# Patient Record
Sex: FEMALE | Race: BLACK OR AFRICAN AMERICAN | NOT HISPANIC OR LATINO | Employment: UNEMPLOYED | URBAN - METROPOLITAN AREA
[De-identification: names, ages, dates, MRNs, and addresses within clinical notes are randomized per-mention and may not be internally consistent; named-entity substitution may affect disease eponyms.]

---

## 2017-10-09 ENCOUNTER — TELEPHONE (OUTPATIENT)
Dept: ADMINISTRATIVE | Facility: HOSPITAL | Age: 62
End: 2017-10-09

## 2017-10-11 PROBLEM — I10 HYPERTENSION: Status: ACTIVE | Noted: 2017-10-11

## 2017-10-11 PROBLEM — E11.9 DIABETES MELLITUS, TYPE 2: Status: ACTIVE | Noted: 2017-10-11

## 2017-10-11 PROBLEM — E78.5 HYPERLIPIDEMIA: Status: ACTIVE | Noted: 2017-10-11

## 2018-01-26 ENCOUNTER — TELEPHONE (OUTPATIENT)
Dept: ADMINISTRATIVE | Facility: HOSPITAL | Age: 63
End: 2018-01-26

## 2018-08-16 ENCOUNTER — TELEPHONE (OUTPATIENT)
Dept: ADMINISTRATIVE | Facility: HOSPITAL | Age: 63
End: 2018-08-16

## 2018-12-18 ENCOUNTER — TELEPHONE (OUTPATIENT)
Dept: ADMINISTRATIVE | Facility: HOSPITAL | Age: 63
End: 2018-12-18

## 2019-03-03 ENCOUNTER — HOSPITAL ENCOUNTER (EMERGENCY)
Facility: HOSPITAL | Age: 64
Discharge: HOME | End: 2019-03-03
Attending: EMERGENCY MEDICINE
Payer: MEDICAID

## 2019-03-03 VITALS
DIASTOLIC BLOOD PRESSURE: 86 MMHG | HEART RATE: 71 BPM | RESPIRATION RATE: 20 BRPM | OXYGEN SATURATION: 98 % | TEMPERATURE: 97 F | SYSTOLIC BLOOD PRESSURE: 180 MMHG

## 2019-03-03 DIAGNOSIS — I10 HYPERTENSION, UNSPECIFIED TYPE: ICD-10-CM

## 2019-03-03 DIAGNOSIS — S39.012A STRAIN OF LUMBAR REGION, INITIAL ENCOUNTER: Primary | ICD-10-CM

## 2019-03-03 PROCEDURE — 25000003 PHARM REV CODE 250: Performed by: NURSE PRACTITIONER

## 2019-03-03 PROCEDURE — 99284 EMERGENCY DEPT VISIT MOD MDM: CPT

## 2019-03-03 RX ORDER — NAPROXEN 250 MG/1
500 TABLET ORAL
Status: COMPLETED | OUTPATIENT
Start: 2019-03-03 | End: 2019-03-03

## 2019-03-03 RX ORDER — NAPROXEN 500 MG/1
500 TABLET ORAL 2 TIMES DAILY PRN
Qty: 30 TABLET | Refills: 0 | Status: SHIPPED | OUTPATIENT
Start: 2019-03-03 | End: 2019-11-11

## 2019-03-03 RX ORDER — CLONIDINE HYDROCHLORIDE 0.1 MG/1
0.1 TABLET ORAL
Status: COMPLETED | OUTPATIENT
Start: 2019-03-03 | End: 2019-03-03

## 2019-03-03 RX ORDER — CYCLOBENZAPRINE HCL 10 MG
5 TABLET ORAL 3 TIMES DAILY PRN
Qty: 9 TABLET | Refills: 0 | Status: SHIPPED | OUTPATIENT
Start: 2019-03-03 | End: 2019-03-08

## 2019-03-03 RX ORDER — ATENOLOL 100 MG/1
100 TABLET ORAL DAILY
Qty: 10 TABLET | Refills: 0 | Status: SHIPPED | OUTPATIENT
Start: 2019-03-03 | End: 2019-03-14 | Stop reason: SDUPTHER

## 2019-03-03 RX ADMIN — CLONIDINE HYDROCHLORIDE 0.1 MG: 0.1 TABLET ORAL at 01:03

## 2019-03-03 RX ADMIN — NAPROXEN 500 MG: 250 TABLET ORAL at 01:03

## 2019-03-03 NOTE — DISCHARGE INSTRUCTIONS
Back Sprain or Strain    Injury to the muscles (strain) or ligaments (sprain) around the spine can be troubling. Injury may occur after a sudden forceful twisting or bending force such as in a car accident, after a simple awkward movement, or after lifting something heavy with poor body positioning. In any case, muscle spasm is often present and adds to the pain.  Thankfully, most people feel better in 1 to 2 weeks, and most of the rest in 1 to 2 months. Most people can remain active. Unless you had a forceful or traumatic physical injury such as a car accident or fall, X-rays may not be ordered for the first evaluation of a back sprain or strain. If pain continues and does not respond to medical treatment, your healthcare provider may then order X-rays and other tests.  Home care  The following guidelines will help you care for your injury at home:  When in bed, try to find a comfortable position. A firm mattress is best. Try lying flat on your back with pillows under your knees. You can also try lying on your side with your knees bent up toward your chest and a pillow between your knees.  Don't sit for long periods. Try not to take long car rides or take other trips that have you sitting for a long time. This puts more stress on the lower back than standing or walking.  During the first 24 to 72 hours after an injury or flare-up, apply an ice pack to the painful area for 20 minutes. Then remove it for 20 minutes. Do this for 60 to 90 minutes, or several times a day. This will reduce swelling and pain. Be sure to wrap the ice pack in a thin towel or plastic to protect your skin.  You can start with ice, then switch to heat. Heat from a hot shower, hot bath, or heating pad reduces pain and works well for muscle spasms. Put heat on the painful area for 20 minutes, then remove for 20 minutes. Do this for 60 to 90 minutes, or several times a day. Do not use a heating pad while sleeping. It can burn the skin.  You can  alternate the ice and heat. Talk with your healthcare provider to find out the best treatment or therapy for your back pain.  Therapeutic massage will help relax the back muscles without stretching them.  Be aware of safe lifting methods. Do not lift anything over 15 pounds until all of the pain is gone.  Medicines  Talk to your healthcare provider before using medicines, especially if you have other health problems or are taking other medicines.  You may use acetaminophen or ibuprofen to control pain, unless another pain medicine was prescribed. If you have chronic conditions like diabetes, liver or kidney disease, stomach ulcers, or gastrointestinal bleeding, or are taking blood-thinner medicines, talk with your doctor before taking any medicines.  Be careful if you are given prescription medicines, narcotics, or medicine for muscle spasm. They can cause drowsiness, and affect your coordination, reflexes, and judgment. Do not drive or operate heavy machinery when taking these types of medicines. Only take pain medicine as prescribed by your healthcare provider.  Follow-up care  Follow up with your healthcare provider, or as advised. You may need physical therapy or more tests if your symptoms get worse.  If you had X-rays your healthcare provider may be checking for any broken bones, breaks, or fractures. Bruises and sprains can sometimes hurt as much as a fracture. These injuries can take time to heal completely. If your symptoms don?t improve or they get worse, talk with your healthcare provider. You may need a repeat X-ray or other tests.  Call 911  Call for emergency care if any of the following occur:  Trouble breathing  Confused  Very drowsy or trouble awakening  Fainting or loss of consciousness  Rapid or very slow heart rate  Loss of bowel or bladder control  When to seek medical advice  Call your healthcare provider right away if any of the following occur:  Pain gets worse or spreads to your arms or  legs  Weakness or numbness in one or both arms or legs  Numbness in the groin or genital area  Date Last Reviewed: 6/1/2016  © 9794-8533 [employer]. [address] [place] [address], [place] [address]. All rights reserved. This information is not intended as a substitute for professional medical care. Always follow your healthcare professional's instructions.

## 2019-03-03 NOTE — ED PROVIDER NOTES
Encounter Date: 3/3/2019       History     Chief Complaint   Patient presents with    Back Pain     Cindy Hernandez is a 63 y.o. Female who presents to the ED with daughter with reports lower back pain x1 week.  Patient reports aching pain to bilateral lower back rated 5/10 on pain scale.  Patient reports this pain increases with movement.  Denies numbness or tingling to lower extremities, denies radiation.  Denies bowel or bladder dysfunction.  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  Reports this may be the culprit of her problem.  Patient reports has been taking ibuprofen at home with moderate relief of pain. Denies dysuria, urgency or frequency.  Denies fever.  Patient also reports has been on atenolol at home.  Reports doctor has been out of town and called for refills, however has been out for a week.  Denies dizziness, lightheadedness, or blurry vision.  Denies chest pain or shortness of breath.        The history is provided by the patient.     Review of patient's allergies indicates:   Allergen Reactions    Sulfa (sulfonamide antibiotics) Hives     Past Medical History:   Diagnosis Date    Arthritis     Diabetes mellitus, type 2     GERD (gastroesophageal reflux disease)     Hyperlipidemia     Hypertension      Past Surgical History:   Procedure Laterality Date     SECTION       No family history on file.  Social History     Tobacco Use    Smoking status: Current Some Day Smoker     Packs/day: 0.50     Years: 25.00     Pack years: 12.50     Types: Cigarettes    Smokeless tobacco: Current User   Substance Use Topics    Alcohol use: No     Alcohol/week: 0.0 oz    Drug use: No     Review of Systems   Constitutional: Negative for activity change, chills and fever.   HENT: Negative.  Negative for congestion, ear discharge, ear pain, postnasal drip, sinus pressure, sinus pain and sore throat.    Eyes: Negative.    Respiratory: Negative.  Negative for  cough, chest tightness and shortness of breath.    Cardiovascular: Negative.  Negative for chest pain.   Gastrointestinal: Negative.  Negative for abdominal distention, abdominal pain and nausea.   Endocrine: Negative.    Genitourinary: Negative.  Negative for dysuria, frequency and urgency.   Musculoskeletal: Positive for arthralgias and back pain.        Bilateral lower back pain. Denies numbness or tingling to bilateral lower extremities.  Denies bowel or bladder dysfunction.   Skin: Negative.  Negative for rash.   Allergic/Immunologic: Negative.    Neurological: Negative.  Negative for dizziness, weakness, light-headedness and numbness.   Hematological: Negative.  Does not bruise/bleed easily.   Psychiatric/Behavioral: Negative.        Physical Exam     Initial Vitals [03/03/19 1248]   BP Pulse Resp Temp SpO2   (!) 213/101 77 20 98.5 °F (36.9 °C) --      MAP       --         Physical Exam    Nursing note and vitals reviewed.  Constitutional: She appears well-developed and well-nourished.   HENT:   Head: Normocephalic and atraumatic.   Right Ear: External ear normal.   Left Ear: External ear normal.   Nose: Nose normal.   Mouth/Throat: Oropharynx is clear and moist.   Eyes: Conjunctivae and EOM are normal. Pupils are equal, round, and reactive to light.   Neck: Normal range of motion. Neck supple.   Cardiovascular: Normal rate, regular rhythm, normal heart sounds and intact distal pulses.   Pulmonary/Chest: Effort normal and breath sounds normal. She has no decreased breath sounds. She has no wheezes. She has no rhonchi. She has no rales.   Abdominal: Soft. Bowel sounds are normal. There is no tenderness.   Musculoskeletal: Normal range of motion. She exhibits tenderness.        Right shoulder: She exhibits tenderness.   Tender to palpate mid l/s spine.  Negative straight leg raise.  No sensory or motor deficits noted.   Neurological: She is alert and oriented to person, place, and time. She has normal strength.  She displays normal reflexes. No cranial nerve deficit or sensory deficit.   Skin: Skin is warm and dry. Capillary refill takes less than 2 seconds. No rash noted.   Psychiatric: She has a normal mood and affect. Her behavior is normal. Judgment and thought content normal.         ED Course   Procedures  Labs Reviewed - No data to display       Imaging Results          X-Ray Lumbar Spine Ap And Lateral (Final result)  Result time 03/03/19 13:16:41    Final result by Jefferson Brewster MD (03/03/19 13:16:41)                 Impression:      Degenerative changes, as above.  No fracture identified      Electronically signed by: Jefferson Brewster MD  Date:    03/03/2019  Time:    13:16             Narrative:    EXAMINATION:  XR LUMBAR SPINE AP AND LATERAL    CLINICAL HISTORY:  Low back pain, <6wks, no red flags, no prior management;Lumbar radiculopathy, <6wks, no red flags, no prior management;Low back pain, minor trauma;    TECHNIQUE:  AP, lateral and spot images were performed of the lumbar spine.    COMPARISON:  None    FINDINGS:  Lumbar spine x-rays demonstrates straightening of the lumbar lordosis.  There is slight narrowing of the L4-5 disc space.  No spondylolisthesis or compression fracture is seen.  Minimal curvature of the lumbar spine is noted to the right.  Lower lumbar facet arthropathy is present with some hypertrophic changes particularly at L4-5.                                  Medications   cloNIDine tablet 0.1 mg (0.1 mg Oral Given 3/3/19 1303)   naproxen tablet 500 mg (500 mg Oral Given 3/3/19 1304)                             Clinical Impression:       ICD-10-CM ICD-9-CM   1. Strain of lumbar region, initial encounter S39.012A 847.2   2. Hypertension, unspecified type I10 401.9         Disposition:   Disposition: Discharged  Condition: Stable       Discharged home with prescription for Flexeril and naproxen.  Refill x's 10 days for atenolol, reinforcement on following up with PCP completed.The patient  acknowledges that close follow up with medical provider is required. Instructed to follow up with PCP within 2 days. Patient was given specific return precautions. The patient agrees to comply with all instruction and directions given in the ER.                  Anel Batista NP  03/03/19 9501

## 2019-03-03 NOTE — ED NOTES
Care assumed of pt, agree with assessment of previous nurse. Continuous visual contact continues as before. Pt remains calm and cooperative. Instructed to call for needs and voiced understanding. Denies needs at  this time.

## 2019-03-03 NOTE — ED NOTES
The patient was seen, evaluated and discharged. All questions were asked and/or answered and the pt was discharged with written and verbal instructions.  Discharged to home/self care.    - Condition at discharge: Good  - Mode of Discharge: Ambulatory  - The patient left the ED accompanied by a family member.  - The discharge instructions were discussed with the patient.  - Patient state an understanding of the discharge instructions.

## 2019-04-11 LAB
LEFT EYE DM RETINOPATHY: NEGATIVE
RIGHT EYE DM RETINOPATHY: NEGATIVE

## 2019-05-01 PROBLEM — I63.81 LACUNAR STROKE: Status: ACTIVE | Noted: 2019-05-01

## 2019-05-01 PROBLEM — I63.9 CVA (CEREBRAL VASCULAR ACCIDENT): Status: ACTIVE | Noted: 2019-05-01

## 2019-05-02 PROBLEM — I63.9 STROKE: Status: ACTIVE | Noted: 2019-05-02

## 2019-05-03 PROBLEM — I63.9 STROKE: Status: RESOLVED | Noted: 2019-05-02 | Resolved: 2019-05-03

## 2019-05-06 ENCOUNTER — PATIENT OUTREACH (OUTPATIENT)
Dept: ADMINISTRATIVE | Facility: HOSPITAL | Age: 64
End: 2019-05-06

## 2019-05-17 ENCOUNTER — NURSE TRIAGE (OUTPATIENT)
Dept: ADMINISTRATIVE | Facility: CLINIC | Age: 64
End: 2019-05-17

## 2019-05-17 NOTE — TELEPHONE ENCOUNTER
Reason for Disposition   Pharmacy calling with prescription question and triager answers question    Protocols used: MEDICATION QUESTION CALL-A-    Caller stating they were trying to get patient's rx for Lipitor transferred from [facility] in [place] to [facility] in [place], [place] but the pharmacy in [place] never received the rx. Patient states she will only be in [place], [place] until the end of this month. 1 rx for Lipitor with no refills called in to [facility] in [place]. No other needs voiced at this time. Please contact caller directly to discuss any further care advice.

## 2019-07-08 PROBLEM — Z79.4 CONTROLLED TYPE 2 DIABETES MELLITUS WITHOUT COMPLICATION, WITH LONG-TERM CURRENT USE OF INSULIN: Status: ACTIVE | Noted: 2017-10-11

## 2019-08-27 PROBLEM — I63.411 CEREBRAL INFARCTION DUE TO EMBOLISM OF RIGHT MIDDLE CEREBRAL ARTERY: Status: ACTIVE | Noted: 2019-08-27

## 2019-08-28 ENCOUNTER — HOSPITAL ENCOUNTER (OUTPATIENT)
Facility: HOSPITAL | Age: 64
LOS: 1 days | Discharge: HOME | End: 2019-08-28
Attending: INTERNAL MEDICINE | Admitting: INTERNAL MEDICINE
Payer: MEDICARE

## 2019-08-28 VITALS
HEART RATE: 84 BPM | RESPIRATION RATE: 18 BRPM | DIASTOLIC BLOOD PRESSURE: 71 MMHG | WEIGHT: 130.31 LBS | OXYGEN SATURATION: 96 % | HEIGHT: 59 IN | BODY MASS INDEX: 26.27 KG/M2 | SYSTOLIC BLOOD PRESSURE: 122 MMHG | TEMPERATURE: 98 F

## 2019-08-28 DIAGNOSIS — I63.9 ACUTE CVA (CEREBROVASCULAR ACCIDENT): ICD-10-CM

## 2019-08-28 DIAGNOSIS — I63.9 STROKE: ICD-10-CM

## 2019-08-28 PROBLEM — E78.5 HYPERLIPIDEMIA: Chronic | Status: ACTIVE | Noted: 2017-10-11

## 2019-08-28 PROBLEM — I10 ESSENTIAL HYPERTENSION: Chronic | Status: ACTIVE | Noted: 2017-10-11

## 2019-08-28 PROBLEM — E11.9 CONTROLLED TYPE 2 DIABETES MELLITUS WITHOUT COMPLICATION, WITH LONG-TERM CURRENT USE OF INSULIN: Chronic | Status: ACTIVE | Noted: 2017-10-11

## 2019-08-28 PROBLEM — F32.A DEPRESSION: Chronic | Status: ACTIVE | Noted: 2019-08-28

## 2019-08-28 PROBLEM — K21.9 GERD (GASTROESOPHAGEAL REFLUX DISEASE): Chronic | Status: ACTIVE | Noted: 2019-08-28

## 2019-08-28 PROBLEM — Z86.73 HISTORY OF CVA (CEREBROVASCULAR ACCIDENT): Status: ACTIVE | Noted: 2019-08-28

## 2019-08-28 PROBLEM — Z79.4 CONTROLLED TYPE 2 DIABETES MELLITUS WITHOUT COMPLICATION, WITH LONG-TERM CURRENT USE OF INSULIN: Chronic | Status: ACTIVE | Noted: 2017-10-11

## 2019-08-28 PROBLEM — Z86.73 HISTORY OF CVA (CEREBROVASCULAR ACCIDENT): Chronic | Status: ACTIVE | Noted: 2019-08-28

## 2019-08-28 LAB
AORTIC ROOT ANNULUS: 2.98 CM
AORTIC VALVE CUSP SEPERATION: 2.1 CM
ASCENDING AORTA: 2.84 CM
AV INDEX (PROSTH): 0.74
AV MEAN GRADIENT: 4 MMHG
AV PEAK GRADIENT: 8 MMHG
AV VALVE AREA: 2.62 CM2
AV VELOCITY RATIO: 0.75
BSA FOR ECHO PROCEDURE: 1.57 M2
CV ECHO LV RWT: 0.7 CM
DOP CALC AO PEAK VEL: 1.37 M/S
DOP CALC AO VTI: 25.13 CM
DOP CALC LVOT AREA: 3.5 CM2
DOP CALC LVOT DIAMETER: 2.12 CM
DOP CALC LVOT PEAK VEL: 1.03 M/S
DOP CALC LVOT STROKE VOLUME: 65.83 CM3
DOP CALCLVOT PEAK VEL VTI: 18.66 CM
E WAVE DECELERATION TIME: 237.59 MSEC
E/A RATIO: 0.56
E/E' RATIO: 7.88 M/S
ECHO LV POSTERIOR WALL: 1.23 CM (ref 0.6–1.1)
ESTIMATED AVG GLUCOSE: 189 MG/DL (ref 68–131)
FRACTIONAL SHORTENING: 40 % (ref 28–44)
HBA1C MFR BLD HPLC: 8.2 % (ref 4–5.6)
INTERVENTRICULAR SEPTUM: 1.26 CM (ref 0.6–1.1)
IVRT: 0.12 MSEC
LA MAJOR: 4.79 CM
LA MINOR: 5.14 CM
LA WIDTH: 3.38 CM
LEFT ATRIUM SIZE: 3.19 CM
LEFT ATRIUM VOLUME INDEX: 29.6 ML/M2
LEFT ATRIUM VOLUME: 45.45 CM3
LEFT INTERNAL DIMENSION IN SYSTOLE: 2.12 CM (ref 2.1–4)
LEFT VENTRICLE DIASTOLIC VOLUME INDEX: 33.55 ML/M2
LEFT VENTRICLE DIASTOLIC VOLUME: 51.56 ML
LEFT VENTRICLE MASS INDEX: 94 G/M2
LEFT VENTRICLE SYSTOLIC VOLUME INDEX: 9.6 ML/M2
LEFT VENTRICLE SYSTOLIC VOLUME: 14.83 ML
LEFT VENTRICULAR INTERNAL DIMENSION IN DIASTOLE: 3.52 CM (ref 3.5–6)
LEFT VENTRICULAR MASS: 144.93 G
LV LATERAL E/E' RATIO: 6.3 M/S
LV SEPTAL E/E' RATIO: 10.5 M/S
MV PEAK A VEL: 1.12 M/S
MV PEAK E VEL: 0.63 M/S
PISA TR MAX VEL: 2.34 M/S
POCT GLUCOSE: 171 MG/DL (ref 70–110)
POCT GLUCOSE: 188 MG/DL (ref 70–110)
PULM VEIN S/D RATIO: 2.16
PV PEAK D VEL: 0.38 M/S
PV PEAK S VEL: 0.82 M/S
PV PEAK VELOCITY: 1.06 CM/S
RA MAJOR: 4.07 CM
RA PRESSURE: 3 MMHG
RA WIDTH: 3.54 CM
RIGHT VENTRICULAR END-DIASTOLIC DIMENSION: 3.55 CM
RV TISSUE DOPPLER FREE WALL SYSTOLIC VELOCITY 1 (APICAL 4 CHAMBER VIEW): 13.47 CM/S
SINUS: 2.82 CM
STJ: 2.33 CM
TDI LATERAL: 0.1 M/S
TDI SEPTAL: 0.06 M/S
TDI: 0.08 M/S
TR MAX PG: 22 MMHG
TRICUSPID ANNULAR PLANE SYSTOLIC EXCURSION: 1.4 CM
TV REST PULMONARY ARTERY PRESSURE: 25 MMHG

## 2019-08-28 PROCEDURE — 99222 PR INITIAL HOSPITAL CARE,LEVL II: ICD-10-PCS | Mod: ,,, | Performed by: PSYCHIATRY & NEUROLOGY

## 2019-08-28 PROCEDURE — 97165 OT EVAL LOW COMPLEX 30 MIN: CPT

## 2019-08-28 PROCEDURE — 25000003 PHARM REV CODE 250: Performed by: INTERNAL MEDICINE

## 2019-08-28 PROCEDURE — 36415 COLL VENOUS BLD VENIPUNCTURE: CPT

## 2019-08-28 PROCEDURE — G8996 SWALLOW CURRENT STATUS: HCPCS | Mod: CH

## 2019-08-28 PROCEDURE — A9585 GADOBUTROL INJECTION: HCPCS | Performed by: INTERNAL MEDICINE

## 2019-08-28 PROCEDURE — 97161 PT EVAL LOW COMPLEX 20 MIN: CPT

## 2019-08-28 PROCEDURE — G8998 SWALLOW D/C STATUS: HCPCS | Mod: CH

## 2019-08-28 PROCEDURE — G0378 HOSPITAL OBSERVATION PER HR: HCPCS

## 2019-08-28 PROCEDURE — 25500020 PHARM REV CODE 255: Performed by: INTERNAL MEDICINE

## 2019-08-28 PROCEDURE — 83036 HEMOGLOBIN GLYCOSYLATED A1C: CPT

## 2019-08-28 PROCEDURE — 92610 EVALUATE SWALLOWING FUNCTION: CPT

## 2019-08-28 PROCEDURE — 99222 1ST HOSP IP/OBS MODERATE 55: CPT | Mod: ,,, | Performed by: PSYCHIATRY & NEUROLOGY

## 2019-08-28 RX ORDER — PANTOPRAZOLE SODIUM 40 MG/1
40 TABLET, DELAYED RELEASE ORAL DAILY
Status: DISCONTINUED | OUTPATIENT
Start: 2019-08-28 | End: 2019-08-28 | Stop reason: HOSPADM

## 2019-08-28 RX ORDER — ASPIRIN 81 MG/1
81 TABLET ORAL DAILY
Status: DISCONTINUED | OUTPATIENT
Start: 2019-08-28 | End: 2019-08-28 | Stop reason: HOSPADM

## 2019-08-28 RX ORDER — GLUCAGON 1 MG
1 KIT INJECTION
Status: DISCONTINUED | OUTPATIENT
Start: 2019-08-28 | End: 2019-08-28 | Stop reason: HOSPADM

## 2019-08-28 RX ORDER — IBUPROFEN 200 MG
16 TABLET ORAL
Status: DISCONTINUED | OUTPATIENT
Start: 2019-08-28 | End: 2019-08-28 | Stop reason: HOSPADM

## 2019-08-28 RX ORDER — CLONIDINE HYDROCHLORIDE 0.1 MG/1
0.1 TABLET ORAL 3 TIMES DAILY PRN
Status: DISCONTINUED | OUTPATIENT
Start: 2019-08-28 | End: 2019-08-28 | Stop reason: HOSPADM

## 2019-08-28 RX ORDER — IBUPROFEN 200 MG
24 TABLET ORAL
Status: DISCONTINUED | OUTPATIENT
Start: 2019-08-28 | End: 2019-08-28 | Stop reason: HOSPADM

## 2019-08-28 RX ORDER — GADOBUTROL 604.72 MG/ML
6 INJECTION INTRAVENOUS
Status: COMPLETED | OUTPATIENT
Start: 2019-08-28 | End: 2019-08-28

## 2019-08-28 RX ORDER — INSULIN ASPART 100 [IU]/ML
6 INJECTION, SOLUTION INTRAVENOUS; SUBCUTANEOUS
Status: DISCONTINUED | OUTPATIENT
Start: 2019-08-28 | End: 2019-08-28 | Stop reason: HOSPADM

## 2019-08-28 RX ORDER — POTASSIUM CHLORIDE 20 MEQ/1
60 TABLET, EXTENDED RELEASE ORAL ONCE
Status: COMPLETED | OUTPATIENT
Start: 2019-08-28 | End: 2019-08-28

## 2019-08-28 RX ORDER — AMOXICILLIN 250 MG
1 CAPSULE ORAL DAILY PRN
Status: DISCONTINUED | OUTPATIENT
Start: 2019-08-28 | End: 2019-08-28 | Stop reason: HOSPADM

## 2019-08-28 RX ORDER — INSULIN ASPART 100 [IU]/ML
0-5 INJECTION, SOLUTION INTRAVENOUS; SUBCUTANEOUS
Status: DISCONTINUED | OUTPATIENT
Start: 2019-08-28 | End: 2019-08-28 | Stop reason: HOSPADM

## 2019-08-28 RX ORDER — ONDANSETRON 2 MG/ML
8 INJECTION INTRAMUSCULAR; INTRAVENOUS EVERY 8 HOURS PRN
Status: DISCONTINUED | OUTPATIENT
Start: 2019-08-28 | End: 2019-08-28 | Stop reason: HOSPADM

## 2019-08-28 RX ORDER — ATORVASTATIN CALCIUM 40 MG/1
80 TABLET, FILM COATED ORAL NIGHTLY
Status: DISCONTINUED | OUTPATIENT
Start: 2019-08-28 | End: 2019-08-28 | Stop reason: HOSPADM

## 2019-08-28 RX ORDER — CLOPIDOGREL BISULFATE 75 MG/1
75 TABLET ORAL DAILY
Status: DISCONTINUED | OUTPATIENT
Start: 2019-08-28 | End: 2019-08-28 | Stop reason: HOSPADM

## 2019-08-28 RX ORDER — FLUOXETINE HYDROCHLORIDE 20 MG/1
20 CAPSULE ORAL DAILY
Status: DISCONTINUED | OUTPATIENT
Start: 2019-08-28 | End: 2019-08-28 | Stop reason: HOSPADM

## 2019-08-28 RX ADMIN — FLUOXETINE 20 MG: 20 CAPSULE ORAL at 04:08

## 2019-08-28 RX ADMIN — GADOBUTROL 6 ML: 604.72 INJECTION INTRAVENOUS at 12:08

## 2019-08-28 RX ADMIN — POTASSIUM CHLORIDE 60 MEQ: 1500 TABLET, EXTENDED RELEASE ORAL at 04:08

## 2019-08-28 RX ADMIN — ASPIRIN 81 MG: 81 TABLET, COATED ORAL at 04:08

## 2019-08-28 NOTE — NURSING
Contacted Dr. Coughlin via chat for telemetry monitor to be off when going for test. See new order. Patient remains NPO.

## 2019-08-28 NOTE — PLAN OF CARE
08/28/19 1633   Final Note   Assessment Type Final Discharge Note   Anticipated Discharge Disposition Home   What phone number can be called within the next 1-3 days to see how you are doing after discharge?   (Listed in chart)   Hospital Follow Up  Appt(s) scheduled? No  (Per clinic nurse will contact pt for both appts, PCP and neurology)   Discharge plans and expectations educations in teach back method with documentation complete? Yes  (Per floor nurse, Connie)   Right Care Referral Info   Post Acute Recommendation No Care     TN informed floor nurse, Connie, care management is complete and can proceed with discharge teaching.

## 2019-08-28 NOTE — PLAN OF CARE
Problem: Occupational Therapy Goal  Goal: Occupational Therapy Goal  Outcome: Outcome(s) achieved Date Met: 08/28/19  OT initial eval complete. Pt completes ADLs and all aspects of functional mobility with supervision. Pt reports no need for OT and compliant with home UE theraband exercises- best friend/neighbor present and agreed/good support system. Pt encouraged to be up in the chair throughout the day, complete ankle pumps, and complete ADL routine with staff SUP. Pt has no DME/OT needs at d/c. OT to sign off.

## 2019-08-28 NOTE — PT/OT/SLP PROGRESS
Speech Language Pathology      Cindy Hernandez  MRN: [id number]    Patient NPO for MRI. ST will attempt swallow eval at bedside following pt testing.    Jaki Sykes, CCC-SLP

## 2019-08-28 NOTE — PT/OT/SLP EVAL
Occupational Therapy   Evaluation and Discharge     Name: Cindy Hernandez  MRN: [id number]  Admitting Diagnosis:  Acute CVA (cerebrovascular accident)      Recommendations:     Discharge Recommendations: home  Discharge Equipment Recommendations:  none  Barriers to discharge:  None    Assessment:     Cindy Hernandez is a 63 y.o. female with a medical diagnosis of Acute CVA (cerebrovascular accident). Pt completes ADLs and all aspects of functional mobility with supervision. Pt reports no need for OT and compliant with UE theraband exercises- best friend/neighbor present and agreed/good support system. Pt encouraged to be up in the chair throughout the day, complete ankle pumps daily, and complete ADL routine with staff SUP. Pt has no DME/OT needs at d/c. OT to sign off.     Rehab Prognosis: Good; patient would benefit from acute skilled OT services to address these deficits and reach maximum level of function.       Plan:     During this hospitalization, patient does not require further acute OT services.  Please re-consult if situation changes.   · Plan of Care Expires:  08/28/19  · Plan of Care Reviewed with: patient, friend    Subjective     Chief Complaint: tired from restless night   Patient/Family Comments/goals: go home     Occupational Profile:  Living Environment: Pt lives in an apartment with [address] by herself. Pt best friend is her neighbor and supportive. Sister lives nearby.   Previous level of function: Independent with ADLs and all aspects of functional mobility. No DME.   Roles and Routines: occasionally drives; works as  in a hotel   Equipment Used at Home:  none  Assistance upon Discharge: sister & friend     Pain/Comfort:  · Pain Rating 1: 0/10    Patients cultural, spiritual, Taoist conflicts given the current situation: no    Objective:     Communicated with: nurseEthan, prior to session.  Patient found HOB elevated with peripheral IV, telemetry upon OT entry to  room.    General Precautions: Standard, fall   Orthopedic Precautions:N/A   Braces: N/A     Occupational Performance:    Bed Mobility:    · Patient completed Rolling/Turning to Right with supervision  · Patient completed Scooting/Bridging with supervision  · Patient completed Supine to Sit with supervision  · Patient completed Sit to Supine with supervision    Functional Mobility/Transfers:  · Patient completed Sit <> Stand Transfer with supervision  with  no assistive device   · Functional Mobility: Pt ambulated from bed>sink>back to bed with SBA>SUP and no AD. Pt was dizzy upon standing, which resolved with small amount of time. Pt educated to take a minute upon changing positions quickly, e.g. Supine>sit or sit>stand transfer, in order to adjust to upright posture before safely ambulating.     Activities of Daily Living:  · Feeding:  NPO    · Grooming: supervision washing her face and hands at the sink   · Upper Body Dressing: set-up with donning back hospital gown    · Lower Body Dressing: supervision donning/doffing socks seated EOB    Cognitive/Visual Perceptual:  Cognitive/Psychosocial Skills:     -       Oriented to: Person, Place, Time and Situation   -       Follows Commands/attention:Follows multistep  commands  -       Communication: minor slurred speech noted; facial symmetry: Right side slightly weaker vs. left   -       Memory: No Deficits noted  -       Safety awareness/insight to disability: intact   -       Mood/Affect/Coping skills/emotional control: Pleasant  Visual/Perceptual:      -Intact  acuity and R/L discrimination      Physical Exam:  Balance:    -       sitting: MOD I; standing: SUP   Postural examination/scapula alignment:    -       Rounded shoulders  -       Forward head  Skin integrity: Visible skin intact  Edema:  no BUE edema noted  Sensation:    -       Intact  light/touch BUE  Dominant hand:    -       Right  Upper Extremity Range of Motion:     -       Right Upper Extremity:  WFL  -       Left Upper Extremity: WFL  Upper Extremity Strength:    -       Right Upper Extremity: WFL  -       Left Upper Extremity: WFL   Strength:    -       Right Upper Extremity: WFL  -       Left Upper Extremity: WFL  Fine Motor Coordination:    -       Intact  Left/Right hand, finger to nose, Left/ Right hand thumb/finger opposition skills (decreased pace),  Left/Right hand, manipulation of objects, and Left/Right hand, diadochokinesis skill  Gross motor coordination:   WFL    AMPAC 6 Click ADL:  AMPAC Total Score: 24    Treatment & Education:  Pt and best friend educated on OT role/POC.   Importance of OOB activity with staff supervision.  Safety during functional t/f and mobility   Multiple self-care tasks/functional mobility completed- assistance level noted above   All questions/concerns answered within OT scope of practice     Education:    Patient left HOB elevated with all lines intact, call button in reach, bed alarm on and best friend present    GOALS:   Multidisciplinary Problems     Occupational Therapy Goals     Not on file          Multidisciplinary Problems (Resolved)        Problem: Occupational Therapy Goal    Goal Priority Disciplines Outcome Interventions   Occupational Therapy Goal   (Resolved)     OT, PT/OT Outcome(s) achieved                    History:     Past Medical History:   Diagnosis Date    Arthritis     Diabetes mellitus, type 2     GERD (gastroesophageal reflux disease)     Hyperlipidemia     Hypertension        Past Surgical History:   Procedure Laterality Date     SECTION         Time Tracking:     OT Date of Treatment: 19  OT Start Time: 1013  OT Stop Time: 1028  OT Total Time (min): 15 min    Billable Minutes:Evaluation 15 min    Alana Loco OT  2019

## 2019-08-28 NOTE — CARE UPDATE
Reviewed H and P by my colleague and agree with A and P. Patient presenting with Stroke symptoms- Neuro consulted. MRI pending.  On ASA/Plavix.  Statin. PT/OT evaluated and rec: home.  Permissive hypertension.      Addendum 2:29pm  MRI- no acute CVA.

## 2019-08-28 NOTE — SUBJECTIVE & OBJECTIVE
Past Medical History:   Diagnosis Date    Arthritis     Diabetes mellitus, type 2     GERD (gastroesophageal reflux disease)     Hyperlipidemia     Hypertension        Past Surgical History:   Procedure Laterality Date     SECTION         Review of patient's allergies indicates:   Allergen Reactions    Sulfa (sulfonamide antibiotics) Hives       Current Facility-Administered Medications on File Prior to Encounter   Medication    [COMPLETED] aspirin EC tablet 325 mg    [COMPLETED] potassium chloride SA CR tablet 20 mEq     Current Outpatient Medications on File Prior to Encounter   Medication Sig    amLODIPine (NORVASC) 5 MG tablet Take 1 tablet (5 mg total) by mouth once daily.    aspirin (ECOTRIN) 81 MG EC tablet Take 81 mg by mouth once daily.    atenolol (TENORMIN) 100 MG tablet Take 1 tablet (100 mg total) by mouth once daily.    atorvastatin (LIPITOR) 80 MG tablet Take 1 tablet (80 mg total) by mouth every evening.    clopidogrel (PLAVIX) 75 mg tablet Take 1 tablet (75 mg total) by mouth once daily.    esomeprazole (NEXIUM) 40 MG capsule Take 1 capsule (40 mg total) by mouth before breakfast.    ferrous sulfate (IRON) 325 mg (65 mg iron) Tab tablet Take 1 tablet (325 mg total) by mouth 3 (three) times daily.    FLUoxetine 20 MG capsule Take 1 capsule (20 mg total) by mouth once daily.    hydroCHLOROthiazide (HYDRODIURIL) 25 MG tablet TAKE ONE TABLET BY MOUTH ONCE DAILY    insulin (LANTUS SOLOSTAR U-100 INSULIN) glargine 100 units/mL (3mL) SubQ pen Inject 18 Units into the skin every evening.    insulin glargine,hum.rec.anlog (BASAGLAR KWIKPEN U-100 INSULIN SUBQ) Inject into the skin.    lisinopril (PRINIVIL,ZESTRIL) 40 MG tablet Take 1 tablet (40 mg total) by mouth every evening.    metFORMIN (GLUCOPHAGE) 1000 MG tablet TAKE ONE TABLET BY MOUTH TWICE DAILY    naproxen (NAPROSYN) 500 MG tablet Take 1 tablet (500 mg total) by mouth 2 (two) times daily as needed (take with meals).  "   pen needle, diabetic 32 gauge x /16" Ndle Use once a day with insulin pen    potassium chloride SA (K-DUR,KLOR-CON) 20 MEQ tablet Take one tablet three times per week    SITagliptin (JANUVIA) 100 MG Tab Take 1 tablet (100 mg total) by mouth once daily.    zolpidem (AMBIEN) 10 mg Tab Take 5 mg by mouth nightly as needed.     Family History     Noncontributory        Tobacco Use    Smoking status: Former Smoker     Packs/day: 0.50     Years: 25.00     Pack years: 12.50     Types: Cigarettes     Last attempt to quit: 4/3/2019     Years since quittin.4    Smokeless tobacco: Current User   Substance and Sexual Activity    Alcohol use: No     Alcohol/week: 0.0 oz    Drug use: No    Sexual activity: Not on file     Review of Systems   Constitutional: Negative for activity change, appetite change, chills, diaphoresis, fatigue, fever and unexpected weight change.   HENT: Negative.    Eyes: Negative.    Respiratory: Negative for cough, chest tightness, shortness of breath and wheezing.    Cardiovascular: Negative for chest pain, palpitations and leg swelling.   Gastrointestinal: Positive for abdominal pain, diarrhea and nausea. Negative for abdominal distention, blood in stool, constipation and vomiting.   Genitourinary: Negative for dysuria and hematuria.   Musculoskeletal: Negative.    Skin: Negative.    Neurological: Positive for speech difficulty, weakness and numbness. Negative for dizziness, tremors, seizures, syncope, facial asymmetry, light-headedness and headaches.   Psychiatric/Behavioral: Negative.      Objective:     Vital Signs (Most Recent):  Temp: 98 °F (36.7 °C) (19 040)  Pulse: 88 (19 040)  Resp: 18 (19)  BP: 134/71 (19 0400)  SpO2: 98 % (19) Vital Signs (24h Range):  Temp:  [98 °F (36.7 °C)-98.1 °F (36.7 °C)] 98 °F (36.7 °C)  Pulse:  [85-95] 88  Resp:  [13-25] 18  SpO2:  [98 %-100 %] 98 %  BP: (134-175)/() 134/71     Weight: 59.1 kg (130 lb 4.7 " oz)  Body mass index is 26.32 kg/m².    Physical Exam   Constitutional: She is oriented to person, place, and time. She appears well-developed and well-nourished. No distress.   HENT:   Head: Normocephalic and atraumatic.   Right Ear: External ear normal.   Left Ear: External ear normal.   Nose: Nose normal.   Eyes: Right eye exhibits no discharge. Left eye exhibits no discharge.   Neck: Normal range of motion.   Cardiovascular: Normal rate, regular rhythm, normal heart sounds and intact distal pulses. Exam reveals no gallop and no friction rub.   No murmur heard.  Pulmonary/Chest: Effort normal and breath sounds normal. No stridor. No respiratory distress. She has no wheezes. She has no rales. She exhibits no tenderness.   Abdominal: Soft. Bowel sounds are normal. She exhibits no distension. There is no tenderness. There is no rebound and no guarding.   Musculoskeletal: Normal range of motion. She exhibits no edema.   Neurological: She is alert and oriented to person, place, and time. GCS eye subscore is 4. GCS verbal subscore is 5. GCS motor subscore is 6.   Reflex Scores:       Bicep reflexes are 2+ on the right side and 1+ on the left side.       Brachioradialis reflexes are 2+ on the right side and 1+ on the left side.       Patellar reflexes are 2+ on the right side and 1+ on the left side.  4/5 strength to the right upper and lower extremity, but 5/5 strength on the left side; there is decreased sensation on the right side; no pronator drift but there is right-sided dysmetria; there is very mild right-sided facial asymmetry and mild dysarthria; no aphasia   Skin: Skin is warm and dry. She is not diaphoretic. No erythema.   Psychiatric: She has a normal mood and affect. Her behavior is normal. Judgment and thought content normal.   Nursing note and vitals reviewed.          Significant Labs: All pertinent labs within the past 24 hours have been reviewed.    Significant Imaging: I have reviewed and interpreted  all pertinent imaging results/findings within the past 24 hours.

## 2019-08-28 NOTE — NURSING
Report received by TAWNY Gomez. The pt arrived in a stretcher by ambulance. She was able to walk to bed from stretcher without any difficulties. Tele monitor initiated and alarms were set. Safety precautions maintained and bed locked in lowest position. Side rails up X2. Call bell and personal items within reach. Will continue to monitor pt for any changes.

## 2019-08-28 NOTE — PLAN OF CARE
08/28/19 1631   Post-Acute Status   Post-Acute Authorization Placement  (home)   Post-Acute Placement Status Set-up Complete

## 2019-08-28 NOTE — ASSESSMENT & PLAN NOTE
CT-head was negative for acute intracranial hemorrhage, and the MRI/MRA-brain are pending.  I have reviewed the EKG and it reveals normal sinus rhythm without evidence of tachyarrhythmias.  TTE is pending.  TeleNeurology was consulted from the ED in deemed that the patient is not within the therapeutic window for tPA. Patient is already aspirin so that is appears to be aspirin failure; will obtain a lipid panel; allow for permissive hypertension in order not to extend the penumbra; consult PT/OT and Speech Therapy for evaluation; consult  for discharge planning; and consult Neurology for further recommendations.  TeleNeurology recommended restarting clopidogrel which was stopped and May 2019.

## 2019-08-28 NOTE — DISCHARGE INSTRUCTIONS
Removed PIV, discussed discharge instructions and clinics will contact her with appointment dates.

## 2019-08-28 NOTE — PT/OT/SLP EVAL
Physical Therapy Evaluation    Patient Name:  Cindy Hernandez   MRN:  [id number]    Recommendations:     Discharge Recommendations:  home   Discharge Equipment Recommendations: none   Barriers to discharge: None    Assessment:     Cindy Hernandez is a 63 y.o. female admitted with a medical diagnosis of Acute CVA (cerebrovascular accident).  She presents with the following impairments/functional limitations:    none, pt as baseline LOF. I in functional mobility without AD    Recent Surgery: * No surgery found *      Plan:     During this hospitalization, patient to be seen (PT I no PT indicated) to address the identified rehab impairments via   and progress toward the following goals:      Subjective     Chief Complaint: pt without c/o  Patient/Family Comments/goals: to go home  Pain/Comfort:  · Pain Rating 1: 0/10    Patients cultural, spiritual, Mandaen conflicts given the current situation: no    Living Environment:  Pt lives next to dtr, no stairs to enter.  Prior to admission, patients level of function was I without AD.  Equipment used at home: none.  DME owned (not currently used): none.  Upon discharge, patient will have assistance from dtr if needed.    Objective:     Communicated with nurse Connie prior to session.  Patient found up in chair with peripheral IV  upon PT entry to room.    General Precautions: Standard, (standard)   Orthopedic Precautions:N/A   Braces: N/A     Exams:  · Cognition:   · Patient is oriented x 4.  · Pt follows approximately 100% of multi-step commands.    · Mood: Pleasant and cooperative.   · Musculoskeletal:  · Posture:      ·  B LE ROM/Strength: WFL R 4 to 4+/5 L 5/5, ROM : WFL  · Neuromuscular:  · Sensation: diminished R to LEs.  · Tone/Reflexes: No impairments identified with functional mobility. No formal testing performed.   · Coordination: WFL  · Balance:  WFL  · Visual-vestibular: No impairments identified with functional mobility. No formal testing  performed.  · Integument: Visible skin intact  · Edema: none noted      Functional Mobility:  · Bed Mobility:     · Supine to Sit: independence  · Sit to Supine: independence  · Transfers:     · Sit to Stand:  independence with no AD  · Gait: pt ambulated x 200ft I'ly  AM-PAC 6 CLICK MOBILITY  Total Score:24     Patient left supine with all lines intact, nurse notified    GOALS:   Multidisciplinary Problems     Physical Therapy Goals        Problem: Physical Therapy Goal    Goal Priority Disciplines Outcome Goal Variances Interventions   Physical Therapy Goal     PT, PT/OT                      History:     Past Medical History:   Diagnosis Date    Arthritis     Diabetes mellitus, type 2     GERD (gastroesophageal reflux disease)     Hyperlipidemia     Hypertension        Past Surgical History:   Procedure Laterality Date     SECTION         Time Tracking:     PT Received On: 19  PT Start Time: 815     PT Stop Time: 829  PT Total Time (min): 14 min     Billable Minutes: Evaluation 14      Torin San, PT  2019

## 2019-08-28 NOTE — PLAN OF CARE
Problem: Diabetes Comorbidity  Goal: Blood Glucose Level Within Desired Range  Outcome: Ongoing (interventions implemented as appropriate)  Intervention: Maintain Glycemic Control     08/28/19 0644   Monitor and Manage Ketoacidosis   Glycemic Management blood glucose monitoring

## 2019-08-28 NOTE — PLAN OF CARE
08/28/19 1631   Discharge Assessment   Assessment Type Discharge Planning Assessment   Confirmed/corrected address and phone number on facesheet? Yes   Assessment information obtained from? Medical Record   Communicated expected length of stay with patient/caregiver no   Prior to hospitilization cognitive status: Alert/Oriented   Prior to hospitalization functional status: Independent   Current cognitive status: Alert/Oriented   Current Functional Status: Independent   Facility Arrived From:   [facility])   Lives With alone   Able to Return to Prior Arrangements yes   Is patient able to care for self after discharge? Yes   Who are your caregiver(s) and their phone number(s)?   (Listed in chart)   Readmission Within the Last 30 Days no previous admission in last 30 days   Patient currently being followed by outpatient case management? No   Patient currently receives any other outside agency services? No   Equipment Currently Used at Home none   Do you have any problems affording any of your prescribed medications? No   Is the patient taking medications as prescribed? yes   Does the patient have transportation home? Yes   Transportation Anticipated family or friend will provide   Does the patient receive services at the [facility]? No   Discharge Plan A Home   DME Needed Upon Discharge  none   Patient/Family in Agreement with Plan yes

## 2019-08-28 NOTE — HPI
Ms. Cindy Hernandez is a 63 y.o. female with essential hypertension, type 2 diabetes mellitus (HbA1c 6.9% Jun 2019), hyperlipidemia (LDL 56.4% Aug 2019), GERD, depression, and history of CVA who presented initially to [facility] ED in [place], [place] with complaints of dysarthria starting at 7:00 PM tonight.  She has started note that her speech was slurred and had some numbness around her mouth.  She also noticed an imbalance in her gait as well as weakness in her right arm and leg.  She had a previous stroke four months ago and had similar symptoms on the same side and became concerned that she was having another stroke.  She denies any lightheadedness, dizziness, headaches, blurry vision, facial drooping, difficulty swallowing, dragging her feet, tripping, falls, nor any loss of consciousness.  She denies any seizures and has not had any chest pain, shortness of breath, palpitations, nor any diaphoresis.  She reports that she is compliant with all her medications and was otherwise in her usual state of health.  She has had mild residual weakness to the right side from her previous stroke.    She also complains of diarrhea for the past two days.  She has had mid abdominal pain that is nonradiating and is 9/10 in severity at its worst.  She cannot describe the quality of the pain but does deny any alleviating or exacerbating factors.  She has not had any fevers or chills but did have some nausea without vomiting.  There has not been any hematochezia nor any melena and she denies any dysuria, hematuria, nor any urinary frequency or urgency.    Of note, the patient's daughter is very upset that the patient is not in a private room.  She was demanding to have Ms. Hernandez be transferred to any [facility] facility other than this one.  She also claims that communication has been very poor with staff.  The patient herself is only concerned about being in a private room.  I explained to them that the system is at  capacity currently and that being transferred to a facility with a bed and starting treatment as soon as possible is very important, and we would try to accommodate her wishes of a private room if possible.

## 2019-08-28 NOTE — PT/OT/SLP EVAL
"Speech Language Pathology Evaluation  Bedside Swallow and Discharge    Patient Name:  Cindy Hernandez   MRN:  [id number]  Admitting Diagnosis: Acute CVA (cerebrovascular accident)    Recommendations:                 General Recommendations:  Follow-up not indicated  Diet recommendations:  Regular, Thin   Aspiration Precautions: 1 bite/sip at a time, Alternating bites/sips, Meds whole 1 at a time, Remain upright 30 minutes post meal, Small bites/sips and Standard aspiration precautions   General Precautions: Standard, fall  Communication strategies:  none    History:     Past Medical History:   Diagnosis Date    Arthritis     Diabetes mellitus, type 2     GERD (gastroesophageal reflux disease)     Hyperlipidemia     Hypertension        Past Surgical History:   Procedure Laterality Date     SECTION         Social History: Patient lives at home.    Modified Barium Swallow: n/a    Chest X-Rays: 19 The lungs are clear.  There are no pleural effusions.    Prior diet: Unrestricted    Subjective     Pt awake in bed upon SLP arrival. Pt just returned from MRI. Pt's friend present for swallow eval. Pt denies speech or swallowing difficulty.    Patient goals: "I'm ready to go home."    Pain/Comfort:  · Pain Rating 1: 0/10    Objective:     Oral Musculature Evaluation  · Oral Musculature: WFL  · Dentition: present and adequate  · Secretion Management: adequate  · Mucosal Quality: good  · Mandibular Strength and Mobility: WFL  · Oral Labial Strength and Mobility: WFL  · Lingual Strength and Mobility: WFL  · Velar Elevation: WFL  · Buccal Strength and Mobility: WFL  · Volitional Cough: strong/productive  · Volitional Swallow: adequate  · Voice Prior to PO Intake: adequate volume/ clear quality  · Oral Musculature Comments: Labial & lingual musculature - WFL for strength, ROM , and coordination    Bedside Swallow Eval:   Consistencies Assessed:  · Thin liquids vi cup rim x2 and straw x3 self " presented  · Puree x4 trials  · Solids x5 trials     Oral Phase:   · WFL    Pharyngeal Phase:   · no overt clinical signs/symptoms of aspiration    Compensatory Strategies  · None    Treatment: No f/u necessary at this time.    Assessment:     Cindy Hernandez is a 63 y.o. female with a dx of R/O CVA. Pt presents with a functional swallow with no overt s/s of aspiration. Rec: Regular diet with thin liquids. No further ST is indicated at this time.    Goals:   Multidisciplinary Problems     SLP Goals     Not on file          Multidisciplinary Problems (Resolved)        Problem: SLP Goal    Goal Priority Disciplines Outcome   SLP Goal   (Resolved)    Low SLP Outcome(s) achieved   Description:  ST.Pt will participate in swallow eval to determine least restrictive diet without overt s/s of apsiration.- GOAL MET 19                    Plan:     · Patient to be seen:      · Plan of Care expires:  19  · Plan of Care reviewed with:  patient, friend   · SLP Follow-Up:  No       Discharge recommendations:  home   Barriers to Discharge:  None    Time Tracking:     SLP Treatment Date:   19  Speech Start Time:  1401  Speech Stop Time:  1417     Speech Total Time (min):  16 min    Billable Minutes: Eval Swallow and Oral Function 16 min    Jaki Sykes CCC-SLP  2019

## 2019-08-28 NOTE — PLAN OF CARE
Problem: SLP Goal  Goal: SLP Goal  ST.Pt will participate in swallow eval to determine least restrictive diet without overt s/s of apsiration.- GOAL MET 19  Outcome: Outcome(s) achieved Date Met: 19 ST: Swallow eval completed. Swallow WFL. Rec: po diet of regular with thin liquids and whole meds.No f/u necessary at this time . Jaki Sykes MA, CCC-SLP

## 2019-08-28 NOTE — PROGRESS NOTES
3211 TN contacted PCP's office, Lian Soria NP to schedule an appt in 1 week. Spoke with Olga who will convey to the clinic nurse who will contact the pt with an appointment.    TN sent a message to Dr. Sancho Whelan' office to schedule a neurology f/u appt in 1-2 weeks. Clinic nurse will contact the pt with an appointment.

## 2019-08-28 NOTE — CONSULTS
"[facility]  Neurology  Consult Note    Patient Name: Cindy Hernandez  MRN: [id number]  Admission Date: 2019  Hospital Length of Stay: 1 days  Code Status: Full Code   Attending Provider: Yasmany Coughlin MD   Consulting Provider: Gerber Martinez MD  Primary Care Physician: Lian Soria NP  Principal Problem:Acute CVA (cerebrovascular accident)    Inpatient consult to Neurology  Consult performed by: Gerber Martinez MD  Consult ordered by: Yasmany Coughlin MD        Subjective:     Chief Complaint: "My speech was slurred."    HPI: 62 y/o female with medical Hx as listed below states that last night her speech became slurred. This was accompanied by  tingling around her mouth and heaviness of RUE. Symptoms resolved but pt was concerned about another stroke as she had a recent one four months ago with similar symptoms. No headaches, visual or speech disturbances, vertigo.     Past Medical History:   Diagnosis Date    Arthritis     Diabetes mellitus, type 2     GERD (gastroesophageal reflux disease)     Hyperlipidemia     Hypertension        Past Surgical History:   Procedure Laterality Date     SECTION         Review of patient's allergies indicates:   Allergen Reactions    Sulfa (sulfonamide antibiotics) Hives       Current Neurological Medications:     Current Facility-Administered Medications on File Prior to Encounter   Medication    [COMPLETED] aspirin EC tablet 325 mg    [COMPLETED] potassium chloride SA CR tablet 20 mEq     Current Outpatient Medications on File Prior to Encounter   Medication Sig    amLODIPine (NORVASC) 5 MG tablet Take 1 tablet (5 mg total) by mouth once daily.    aspirin (ECOTRIN) 81 MG EC tablet Take 81 mg by mouth once daily.    atenolol (TENORMIN) 100 MG tablet Take 1 tablet (100 mg total) by mouth once daily.    atorvastatin (LIPITOR) 80 MG tablet Take 1 tablet (80 mg total) by mouth every evening.    clopidogrel (PLAVIX) 75 mg " "tablet Take 1 tablet (75 mg total) by mouth once daily.    esomeprazole (NEXIUM) 40 MG capsule Take 1 capsule (40 mg total) by mouth before breakfast.    ferrous sulfate (IRON) 325 mg (65 mg iron) Tab tablet Take 1 tablet (325 mg total) by mouth 3 (three) times daily.    FLUoxetine 20 MG capsule Take 1 capsule (20 mg total) by mouth once daily.    hydroCHLOROthiazide (HYDRODIURIL) 25 MG tablet TAKE ONE TABLET BY MOUTH ONCE DAILY    insulin (LANTUS SOLOSTAR U-100 INSULIN) glargine 100 units/mL (3mL) SubQ pen Inject 18 Units into the skin every evening.    insulin glargine,hum.rec.anlog (BASAGLAR KWIKPEN U-100 INSULIN SUBQ) Inject into the skin.    lisinopril (PRINIVIL,ZESTRIL) 40 MG tablet Take 1 tablet (40 mg total) by mouth every evening.    metFORMIN (GLUCOPHAGE) 1000 MG tablet TAKE ONE TABLET BY MOUTH TWICE DAILY    naproxen (NAPROSYN) 500 MG tablet Take 1 tablet (500 mg total) by mouth 2 (two) times daily as needed (take with meals).    pen needle, diabetic 32 gauge x 5/16" Ndle Use once a day with insulin pen    potassium chloride SA (K-DUR,KLOR-CON) 20 MEQ tablet Take one tablet three times per week    SITagliptin (JANUVIA) 100 MG Tab Take 1 tablet (100 mg total) by mouth once daily.    zolpidem (AMBIEN) 10 mg Tab Take 5 mg by mouth nightly as needed.      Family History     None        Tobacco Use    Smoking status: Former Smoker     Packs/day: 0.50     Years: 25.00     Pack years: 12.50     Types: Cigarettes     Last attempt to quit: 4/3/2019     Years since quittin.4    Smokeless tobacco: Current User   Substance and Sexual Activity    Alcohol use: No     Alcohol/week: 0.0 oz    Drug use: No    Sexual activity: Not on file     Review of Systems   Constitutional: Negative for fever.   HENT: Negative for trouble swallowing.    Eyes: Negative for photophobia.   Respiratory: Negative for shortness of breath.    Cardiovascular: Negative for chest pain.   Gastrointestinal: Negative for " abdominal pain.   Genitourinary: Negative for dysuria.   Musculoskeletal: Negative for back pain.   Neurological: Negative for headaches.   Psychiatric/Behavioral: Negative for confusion.     Objective:     Vital Signs (Most Recent):  Temp: 98.1 °F (36.7 °C) (08/28/19 0822)  Pulse: 84 (08/28/19 0822)  Resp: 18 (08/28/19 0822)  BP: 122/71 (08/28/19 0822)  SpO2: 96 % (08/28/19 0822) Vital Signs (24h Range):  Temp:  [98 °F (36.7 °C)-98.1 °F (36.7 °C)] 98.1 °F (36.7 °C)  Pulse:  [84-95] 84  Resp:  [13-25] 18  SpO2:  [96 %-100 %] 96 %  BP: (122-175)/() 122/71     Weight: 59.1 kg (130 lb 4.7 oz)  Body mass index is 26.32 kg/m².    Physical Exam   Constitutional: She is oriented to person, place, and time. No distress.   Eyes: Right eye exhibits no discharge. Left eye exhibits no discharge.   Neck: Normal range of motion.   Cardiovascular: Regular rhythm.   Pulmonary/Chest: Breath sounds normal.   Abdominal: Bowel sounds are normal.   Musculoskeletal: She exhibits no edema.   Neurological: She is oriented to person, place, and time. She has normal strength. She has a normal Finger-Nose-Finger Test. Gait normal.   Skin: She is not diaphoretic.   Psychiatric: Her speech is normal.       NEUROLOGICAL EXAMINATION:     MENTAL STATUS   Oriented to person, place, and time.   Speech: speech is normal   Level of consciousness: alert    CRANIAL NERVES     CN III, IV, VI   Right pupil: Size: 2 mm. Shape: regular. Reactivity: brisk.   Left pupil: Size: 2 mm. Shape: regular. Reactivity: brisk.   Nystagmus: none   Ophthalmoparesis: none  Conjugate gaze: present    CN V   Right facial sensation deficit: none  Left facial sensation deficit: none    CN VII   Right facial weakness: none  Left facial weakness: none    CN IX, X   Palate: symmetric    CN XI   Right trapezius strength: normal  Left trapezius strength: normal    CN XII   Tongue deviation: none    MOTOR EXAM     Strength   Strength 5/5 throughout.     SENSORY EXAM   Right  arm light touch: normal  Left arm light touch: normal  Right leg light touch: normal  Left leg light touch: normal    GAIT AND COORDINATION     Gait  Gait: normal     Coordination   Finger to nose coordination: normal    NIHSS: 0      Significant Labs:   CBC:   Recent Labs   Lab 08/27/19 2231   WBC 13.40*   HGB 13.3   HCT 42.8        CMP:   Recent Labs   Lab 08/27/19 2231   *      K 2.9*      CO2 24   BUN 19   CREATININE 0.8   CALCIUM 10.4   PROT 7.7   ALBUMIN 4.1   BILITOT 0.4   ALKPHOS 66   AST 12   ALT 14   ANIONGAP 15   EGFRNONAA >60.0     LIPIDS/LFTS:  Recent Labs   Lab 05/01/19  1310 06/28/19  1258 08/27/19 2231   Cholesterol 151 105 L 120   Triglycerides 160 H 85 178 H   HDL 37 L 35 L 28 L   LDL Cholesterol 82.0 53.0 L 56.4 L   Non-HDL Cholesterol 114 70 92         Significant Imaging:  MRI brain  Impression       1. Extensive subcortical and deep white matter changes likely from chronic microvascular ischemic disease.  2. No acute cerebral infarctions.      Electronically signed by: Speedy Mckeon MD  Date: 08/28/2019  Time: 13:27       Assessment and Plan:     64 y/o female consulted for stroke    1. Stroke: no stroke on MRI. Symptoms resolved.   It this re-expression of previous stroke?   Echo with no major abnormalities.   -Continue antiplatelets.   -OK to D/C home from neurology standpoint.    Active Diagnoses:    Diagnosis Date Noted POA    PRINCIPAL PROBLEM:  Acute CVA (cerebrovascular accident) [I63.9] 08/28/2019 Yes    GERD (gastroesophageal reflux disease) [K21.9] 08/28/2019 Yes     Chronic    Depression [F32.9] 08/28/2019 Yes     Chronic    History of CVA (cerebrovascular accident) [Z86.73] 08/28/2019 Not Applicable     Chronic    Essential hypertension [I10] 10/11/2017 Yes     Chronic    Controlled type 2 diabetes mellitus without complication, with long-term current use of insulin [E11.9, Z79.4] 10/11/2017 Not Applicable     Chronic    Hyperlipidemia [E78.5]  10/11/2017 Yes     Chronic      Problems Resolved During this Admission:       VTE Risk Mitigation (From admission, onward)        Ordered     IP VTE LOW RISK PATIENT  Once      08/28/19 0409     Place sequential compression device  Until discontinued      08/28/19 0409          Thank you for your consult. I will follow-up with patient. Please contact us if you have any additional questions.    Gerber Martinez MD  Neurology  [facility]

## 2019-08-28 NOTE — H&P
[facility] Hospital Medicine  History & Physical    Patient Name: Cindy Hernandez  MRN: [id number]  Admission Date: 8/28/2019  Attending Physician: Joyce Vu MD   Primary Care Provider: Lian Soria NP         Patient information was obtained from patient.     Subjective:     Principal Problem:Acute CVA (cerebrovascular accident)    Chief Complaint: Slurred speech today.    HPI: Ms. Cindy Hernandez is a 63 y.o. female with essential hypertension, type 2 diabetes mellitus (HbA1c 6.9% Jun 2019), hyperlipidemia (LDL 56.4% Aug 2019), GERD, depression, and history of CVA who presented initially to [facility] ED in [place], [place] with complaints of dysarthria starting at 7:00 PM tonight.  She has started note that her speech was slurred and had some numbness around her mouth.  She also noticed an imbalance in her gait as well as weakness in her right arm and leg.  She had a previous stroke four months ago and had similar symptoms on the same side and became concerned that she was having another stroke.  She denies any lightheadedness, dizziness, headaches, blurry vision, facial drooping, difficulty swallowing, dragging her feet, tripping, falls, nor any loss of consciousness.  She denies any seizures and has not had any chest pain, shortness of breath, palpitations, nor any diaphoresis.  She reports that she is compliant with all her medications and was otherwise in her usual state of health.  She has had mild residual weakness to the right side from her previous stroke.    She also complains of diarrhea for the past two days.  She has had mid abdominal pain that is nonradiating and is 9/10 in severity at its worst.  She cannot describe the quality of the pain but does deny any alleviating or exacerbating factors.  She has not had any fevers or chills but did have some nausea without vomiting.  There has not been any hematochezia nor any melena and she denies any dysuria, hematuria,  nor any urinary frequency or urgency.    Of note, the patient's daughter is very upset that the patient is not in a private room.  She was demanding to have Ms. Hernandez be transferred to any [facility] facility other than this one.  She also claims that communication has been very poor with staff.  The patient herself is only concerned about being in a private room.  I explained to them that the system is at capacity currently and that being transferred to a facility with a bed and starting treatment as soon as possible is very important, and we would try to accommodate her wishes of a private room if possible.    Chart Review:  Previous Hospitalizations  Date Hospital Diagnosis   May 2019 [facility] Acute CVA     Outpatient Follow-Up  Date of Visit Physician Service   2019 Lian Soria NP Primary Care   May 2019 Byron Rogers MD Vascular Surgery     Past Medical History:   Diagnosis Date    Arthritis     Diabetes mellitus, type 2     GERD (gastroesophageal reflux disease)     Hyperlipidemia     Hypertension        Past Surgical History:   Procedure Laterality Date     SECTION         Review of patient's allergies indicates:   Allergen Reactions    Sulfa (sulfonamide antibiotics) Hives       Current Facility-Administered Medications on File Prior to Encounter   Medication    [COMPLETED] aspirin EC tablet 325 mg    [COMPLETED] potassium chloride SA CR tablet 20 mEq     Current Outpatient Medications on File Prior to Encounter   Medication Sig    amLODIPine (NORVASC) 5 MG tablet Take 1 tablet (5 mg total) by mouth once daily.    aspirin (ECOTRIN) 81 MG EC tablet Take 81 mg by mouth once daily.    atenolol (TENORMIN) 100 MG tablet Take 1 tablet (100 mg total) by mouth once daily.    atorvastatin (LIPITOR) 80 MG tablet Take 1 tablet (80 mg total) by mouth every evening.    clopidogrel (PLAVIX) 75 mg tablet Take 1 tablet (75 mg total) by mouth once daily.    esomeprazole (NEXIUM) 40 MG  "capsule Take 1 capsule (40 mg total) by mouth before breakfast.    ferrous sulfate (IRON) 325 mg (65 mg iron) Tab tablet Take 1 tablet (325 mg total) by mouth 3 (three) times daily.    FLUoxetine 20 MG capsule Take 1 capsule (20 mg total) by mouth once daily.    hydroCHLOROthiazide (HYDRODIURIL) 25 MG tablet TAKE ONE TABLET BY MOUTH ONCE DAILY    insulin (LANTUS SOLOSTAR U-100 INSULIN) glargine 100 units/mL (3mL) SubQ pen Inject 18 Units into the skin every evening.    insulin glargine,hum.rec.anlog (BASAGLAR KWIKPEN U-100 INSULIN SUBQ) Inject into the skin.    lisinopril (PRINIVIL,ZESTRIL) 40 MG tablet Take 1 tablet (40 mg total) by mouth every evening.    metFORMIN (GLUCOPHAGE) 1000 MG tablet TAKE ONE TABLET BY MOUTH TWICE DAILY    naproxen (NAPROSYN) 500 MG tablet Take 1 tablet (500 mg total) by mouth 2 (two) times daily as needed (take with meals).    pen needle, diabetic 32 gauge x 5/16" Ndle Use once a day with insulin pen    potassium chloride SA (K-DUR,KLOR-CON) 20 MEQ tablet Take one tablet three times per week    SITagliptin (JANUVIA) 100 MG Tab Take 1 tablet (100 mg total) by mouth once daily.    zolpidem (AMBIEN) 10 mg Tab Take 5 mg by mouth nightly as needed.     Family History     Noncontributory        Tobacco Use    Smoking status: Former Smoker     Packs/day: 0.50     Years: 25.00     Pack years: 12.50     Types: Cigarettes     Last attempt to quit: 4/3/2019     Years since quittin.4    Smokeless tobacco: Current User   Substance and Sexual Activity    Alcohol use: No     Alcohol/week: 0.0 oz    Drug use: No    Sexual activity: Not on file     Review of Systems   Constitutional: Negative for activity change, appetite change, chills, diaphoresis, fatigue, fever and unexpected weight change.   HENT: Negative.    Eyes: Negative.    Respiratory: Negative for cough, chest tightness, shortness of breath and wheezing.    Cardiovascular: Negative for chest pain, palpitations and leg " swelling.   Gastrointestinal: Positive for abdominal pain, diarrhea and nausea. Negative for abdominal distention, blood in stool, constipation and vomiting.   Genitourinary: Negative for dysuria and hematuria.   Musculoskeletal: Negative.    Skin: Negative.    Neurological: Positive for speech difficulty, weakness and numbness. Negative for dizziness, tremors, seizures, syncope, facial asymmetry, light-headedness and headaches.   Psychiatric/Behavioral: Negative.      Objective:     Vital Signs (Most Recent):  Temp: 98 °F (36.7 °C) (08/28/19 0400)  Pulse: 88 (08/28/19 0400)  Resp: 18 (08/28/19 0400)  BP: 134/71 (08/28/19 0400)  SpO2: 98 % (08/28/19 0400) Vital Signs (24h Range):  Temp:  [98 °F (36.7 °C)-98.1 °F (36.7 °C)] 98 °F (36.7 °C)  Pulse:  [85-95] 88  Resp:  [13-25] 18  SpO2:  [98 %-100 %] 98 %  BP: (134-175)/() 134/71     Weight: 59.1 kg (130 lb 4.7 oz)  Body mass index is 26.32 kg/m².    Physical Exam   Constitutional: She is oriented to person, place, and time. She appears well-developed and well-nourished. No distress.   HENT:   Head: Normocephalic and atraumatic.   Right Ear: External ear normal.   Left Ear: External ear normal.   Nose: Nose normal.   Eyes: Right eye exhibits no discharge. Left eye exhibits no discharge.   Neck: Normal range of motion.   Cardiovascular: Normal rate, regular rhythm, normal heart sounds and intact distal pulses. Exam reveals no gallop and no friction rub.   No murmur heard.  Pulmonary/Chest: Effort normal and breath sounds normal. No stridor. No respiratory distress. She has no wheezes. She has no rales. She exhibits no tenderness.   Abdominal: Soft. Bowel sounds are normal. She exhibits no distension. There is no tenderness. There is no rebound and no guarding.   Musculoskeletal: Normal range of motion. She exhibits no edema.   Neurological: She is alert and oriented to person, place, and time. GCS eye subscore is 4. GCS verbal subscore is 5. GCS motor subscore is  6.   Reflex Scores:       Bicep reflexes are 2+ on the right side and 1+ on the left side.       Brachioradialis reflexes are 2+ on the right side and 1+ on the left side.       Patellar reflexes are 2+ on the right side and 1+ on the left side.  4/5 strength to the right upper and lower extremity, but 5/5 strength on the left side; there is decreased sensation on the right side; no pronator drift but there is right-sided dysmetria; there is very mild right-sided facial asymmetry and mild dysarthria; no aphasia   Skin: Skin is warm and dry. She is not diaphoretic. No erythema.   Psychiatric: She has a normal mood and affect. Her behavior is normal. Judgment and thought content normal.   Nursing note and vitals reviewed.          Significant Labs: All pertinent labs within the past 24 hours have been reviewed.    Significant Imaging: I have reviewed and interpreted all pertinent imaging results/findings within the past 24 hours.    Assessment/Plan:     * Acute CVA (cerebrovascular accident)  CT-head was negative for acute intracranial hemorrhage, and the MRI/MRA-brain are pending.  I have reviewed the EKG and it reveals normal sinus rhythm without evidence of tachyarrhythmias.  TTE is pending.  TeleNeurology was consulted from the ED in deemed that the patient is not within the therapeutic window for tPA. Patient is already aspirin so that is appears to be aspirin failure; will obtain a lipid panel; allow for permissive hypertension in order not to extend the penumbra; consult PT/OT and Speech Therapy for evaluation; consult  for discharge planning; and consult Neurology for further recommendations.  TeleNeurology recommended restarting clopidogrel which was stopped and May 2019.    Essential hypertension  As addressed above--well allow permissive hypertension.    Hyperlipidemia  Well controlled; will continue her home regimen atorvastatin.    GERD (gastroesophageal reflux disease)  Will substitute his  home regimen of esomeprazole for pantoprazole while she is inpatient.    Depression  Stable; will continue her home regimen of fluoxetine.    History of CVA (cerebrovascular accident)  As addressed above.    VTE Risk Mitigation (From admission, onward)        Ordered     IP VTE LOW RISK PATIENT  Once      08/28/19 0409     Place sequential compression device  Until discontinued      08/28/19 0409             Joyce Vu M.D.  Staff Nocturnist  Department of Hospital Medicine  [facility]  Pager: [phone]          N.B.: Portions of this note was dictated using M*Modal Fluency Direct--there may be voice recognition errors occasionally missed on review.

## 2019-08-28 NOTE — HOSPITAL COURSE
HPI: Ms. Cindy Hernandez is a 63 y.o. female with essential hypertension, type 2 diabetes mellitus (HbA1c 6.9% Jun 2019), hyperlipidemia (LDL 56.4% Aug 2019), GERD, depression, and history of CVA who presented initially to [facility] ED in [place], [place] with complaints of dysarthria starting at 7:00 PM tonight.  She has started note that her speech was slurred and had some numbness around her mouth.  She also noticed an imbalance in her gait as well as weakness in her right arm and leg.  Patient was admitted to the hospital [facility] was consulted. MRI of the head was negative for acute changes.  Patient's symptoms all resolved. She already is on a statin, asa and Plavix.  On 8/28, the patient wanted to be discharged. I offered her one more day in the hospital to monitor but she elected to go home- reasonable. I discussed case with neuro- ok to discharge with close outpatient follow up with Dr. Whelan.  Activity as tolerated. Diet- low NA, ADA 1800 carmen diet.

## 2019-08-28 NOTE — PLAN OF CARE
Problem: Physical Therapy Goal  Goal: Physical Therapy Goal  PT eval completed, pt at baseline: I in mobility without AD. No further PT indicated.

## 2019-08-28 NOTE — DISCHARGE SUMMARY
[facility] Hospital Medicine  Discharge Summary      Patient Name: Cindy Hernandez  MRN: [id number]  Admission Date: 8/28/2019  Hospital Length of Stay: 0 days  Discharge Date and Time:  08/28/2019 3:40 PM  Attending Physician: Yasmany Coughlin MD   Discharging Provider: Yasmany Coughlin MD  Primary Care Provider: Lian Soria NP      HPI:   Ms. Cindy Hernandez is a 63 y.o. female with essential hypertension, type 2 diabetes mellitus (HbA1c 6.9% Jun 2019), hyperlipidemia (LDL 56.4% Aug 2019), GERD, depression, and history of CVA who presented initially to [facility] ED in [place], [place] with complaints of dysarthria starting at 7:00 PM tonight.  She has started note that her speech was slurred and had some numbness around her mouth.  She also noticed an imbalance in her gait as well as weakness in her right arm and leg.  She had a previous stroke four months ago and had similar symptoms on the same side and became concerned that she was having another stroke.  She denies any lightheadedness, dizziness, headaches, blurry vision, facial drooping, difficulty swallowing, dragging her feet, tripping, falls, nor any loss of consciousness.  She denies any seizures and has not had any chest pain, shortness of breath, palpitations, nor any diaphoresis.  She reports that she is compliant with all her medications and was otherwise in her usual state of health.  She has had mild residual weakness to the right side from her previous stroke.    She also complains of diarrhea for the past two days.  She has had mid abdominal pain that is nonradiating and is 9/10 in severity at its worst.  She cannot describe the quality of the pain but does deny any alleviating or exacerbating factors.  She has not had any fevers or chills but did have some nausea without vomiting.  There has not been any hematochezia nor any melena and she denies any dysuria, hematuria, nor any urinary frequency or  urgency.    Of note, the patient's daughter is very upset that the patient is not in a private room.  She was demanding to have Ms. Hernandez be transferred to any [facility] facility other than this one.  She also claims that communication has been very poor with staff.  The patient herself is only concerned about being in a private room.  I explained to them that the system is at capacity currently and that being transferred to a facility with a bed and starting treatment as soon as possible is very important, and we would try to accommodate her wishes of a private room if possible.    * No surgery found *      Hospital Course:   HPI: Ms. Cindy Hernandez is a 63 y.o. female with essential hypertension, type 2 diabetes mellitus (HbA1c 6.9% Jun 2019), hyperlipidemia (LDL 56.4% Aug 2019), GERD, depression, and history of CVA who presented initially to [facility] ED in [place], [place] with complaints of dysarthria starting at 7:00 PM tonight.  She has started note that her speech was slurred and had some numbness around her mouth.  She also noticed an imbalance in her gait as well as weakness in her right arm and leg.  Patient was admitted to the hospital Neruo was consulted. MRI of the head was negative for acute changes.  Patient's symptoms all resolved. She already is on a statin, asa and Plavix.  On 8/28, the patient wanted to be discharged. I offered her one more day in the hospital to monitor but she elected to go home- reasonable. I discussed case with neuro- ok to discharge with close outpatient follow up with Dr. Whelan.  Activity as tolerated. Diet- low NA, ADA 1800 carmen diet.       Consults:   Consults (From admission, onward)        Status Ordering Provider     Inpatient consult to Neurology  Once     Provider:  (Not yet assigned)    Acknowledged LORI CARRIZALES     Inpatient consult to Registered Dietitian/Nutritionist  Once     Provider:  (Not yet assigned)    Acknowledged MARCELLA CHAPMAN  consult to case management/social work  Once     Provider:  (Not yet assigned)    Acknowledged MARCELLA CHAPMAN          No new Assessment & Plan notes have been filed under this hospital service since the last note was generated.  Service: Hospital Medicine    Final Active Diagnoses:    Diagnosis Date Noted POA    PRINCIPAL PROBLEM:  Acute CVA (cerebrovascular accident) [I63.9] 08/28/2019 Yes    GERD (gastroesophageal reflux disease) [K21.9] 08/28/2019 Yes     Chronic    Depression [F32.9] 08/28/2019 Yes     Chronic    History of CVA (cerebrovascular accident) [Z86.73] 08/28/2019 Not Applicable     Chronic    Essential hypertension [I10] 10/11/2017 Yes     Chronic    Controlled type 2 diabetes mellitus without complication, with long-term current use of insulin [E11.9, Z79.4] 10/11/2017 Not Applicable     Chronic    Hyperlipidemia [E78.5] 10/11/2017 Yes     Chronic      Problems Resolved During this Admission:       Discharged Condition: good    Disposition:  to home.     Follow Up:  Follow-up Information     Lian Soria NP In 1 week.    Specialties:  Internal Medicine, Family Medicine  Contact information:  [address] [facility] [address]  [phone]             Sancho Whelan MD In 1 week.    Specialty:  Neurology  Contact information:  [address] [facility] [address]  [address]  [facility] [address]  [phone]                 Patient Instructions:   No discharge procedures on file.    Significant Diagnostic Studies:     Pending Diagnostic Studies:     Procedure Component Value Units Date/Time    Transthoracic echo (TTE) 2D with Color Flow [[id number]]  (Abnormal) Resulted:  08/28/19 1414    Order Status:  Sent Lab Status:  In process Updated:  08/28/19 1417     BSA 1.57 m2      TDI SEPTAL 0.06 m/s      LV LATERAL E/E' RATIO 6.30 m/s      LV SEPTAL E/E' RATIO 10.50 m/s      LA WIDTH 3.38 cm      AORTIC VALVE CUSP SEPERATION 2.10 cm      TDI LATERAL 0.10 m/s      PV PEAK VELOCITY 1.06 cm/s      LVIDD 3.52 cm       IVS 1.26 cm      PW 1.23 cm      Ao root annulus 2.98 cm      LVIDS 2.12 cm      FS 40 %      LA volume 45.45 cm3      Sinus 2.82 cm      STJ 2.33 cm      Ascending aorta 2.84 cm      LV mass 144.93 g      LA size 3.19 cm      RVDD 3.55 cm      TAPSE 1.40 cm      RV S' 13.47 cm/s      Left Ventricle Relative Wall Thickness 0.70 cm      AV mean gradient 4 mmHg      AV valve area 2.62 cm2      AV Velocity Ratio 0.75     AV index (prosthetic) 0.74     E/A ratio 0.56     Mean e' 0.08 m/s      E wave decelartion time 237.59 msec      IVRT 0.12 msec      Pulm vein S/D ratio 2.16     LVOT diameter 2.12 cm      LVOT area 3.5 cm2      LVOT peak enrico 1.03 m/s      LVOT peak VTI 18.66 cm      Ao peak enrico 1.37 m/s      Ao VTI 25.13 cm      LVOT stroke volume 65.83 cm3      AV peak gradient 8 mmHg      E/E' ratio 7.88 m/s      MV Peak E Enrico 0.63 m/s      TR Max Enrico 2.34 m/s      MV Peak A Enrico 1.12 m/s      PV Peak S Enrico 0.82 m/s      PV Peak D Enrico 0.38 m/s      LV Systolic Volume 14.83 mL      LV Systolic Volume Index 9.6 mL/m2      LV Diastolic Volume 51.56 mL      LV Diastolic Volume Index 33.55 mL/m2      LA Volume Index 29.6 mL/m2      LV Mass Index 94 g/m2      RA Major Axis 4.07 cm      Left Atrium Minor Axis 5.14 cm      Left Atrium Major Axis 4.79 cm      Triscuspid Valve Regurgitation Peak Gradient 22 mmHg      RA Width 3.54 cm          Medications:  Reconciled Home Medications:      Medication List      CONTINUE taking these medications    amLODIPine 5 MG tablet  Commonly known as:  NORVASC  Take 1 tablet (5 mg total) by mouth once daily.     aspirin 81 MG EC tablet  Commonly known as:  ECOTRIN  Take 81 mg by mouth once daily.     atenolol 100 MG tablet  Commonly known as:  TENORMIN  Take 1 tablet (100 mg total) by mouth once daily.     atorvastatin 80 MG tablet  Commonly known as:  LIPITOR  Take 1 tablet (80 mg total) by mouth every evening.     clopidogrel 75 mg tablet  Commonly known as:  PLAVIX  Take 1 tablet (75 mg  "total) by mouth once daily.     esomeprazole 40 MG capsule  Commonly known as:  NEXIUM  Take 1 capsule (40 mg total) by mouth before breakfast.     ferrous sulfate 325 mg (65 mg iron) Tab tablet  Commonly known as:  IRON  Take 1 tablet (325 mg total) by mouth 3 (three) times daily.     FLUoxetine 20 MG capsule  Take 1 capsule (20 mg total) by mouth once daily.     hydroCHLOROthiazide 25 MG tablet  Commonly known as:  HYDRODIURIL  TAKE ONE TABLET BY MOUTH ONCE DAILY     * BASAGLAR KWIKPEN U-100 INSULIN SUBQ  Inject into the skin.     * insulin glargine 100 units/mL (3mL) SubQ pen  Commonly known as:  LANTUS SOLOSTAR U-100 INSULIN  Inject 18 Units into the skin every evening.     lisinopril 40 MG tablet  Commonly known as:  PRINIVIL,ZESTRIL  Take 1 tablet (40 mg total) by mouth every evening.     metFORMIN 1000 MG tablet  Commonly known as:  GLUCOPHAGE  TAKE ONE TABLET BY MOUTH TWICE DAILY     naproxen 500 MG tablet  Commonly known as:  NAPROSYN  Take 1 tablet (500 mg total) by mouth 2 (two) times daily as needed (take with meals).     pen needle, diabetic 32 gauge x 5/16" Ndle  Use once a day with insulin pen     potassium chloride SA 20 MEQ tablet  Commonly known as:  K-DUR,KLOR-CON  Take one tablet three times per week     SITagliptin 100 MG Tab  Commonly known as:  JANUVIA  Take 1 tablet (100 mg total) by mouth once daily.     zolpidem 10 mg Tab  Commonly known as:  AMBIEN  Take 5 mg by mouth nightly as needed.         * This list has 2 medication(s) that are the same as other medications prescribed for you. Read the directions carefully, and ask your doctor or other care provider to review them with you.                Indwelling Lines/Drains at time of discharge:   Lines/Drains/Airways          None          Time spent on the discharge of patient: < 30 minutes  Patient was seen and examined on the date of discharge and determined to be suitable for discharge.         Yasmany Mcknight MD  Department of " Hospital Medicine [facility]

## 2019-08-29 ENCOUNTER — TELEPHONE (OUTPATIENT)
Dept: NEUROLOGY | Facility: CLINIC | Age: 64
End: 2019-08-29

## 2019-08-29 NOTE — TELEPHONE ENCOUNTER
----- Message from Angélica Villarreal RN sent at 8/28/2019  4:22 PM CDT -----  Contact: Angélica Villarreal RN Case Manager [phone]  Hi, Happy Wednesday!    The patient is discharged today. The patient had a CVA and needs an appointment in 1-2 weeks. Please contact the patient with the appoitment.    Thanks,    Angélica      Left message to call office

## 2020-01-29 ENCOUNTER — PATIENT OUTREACH (OUTPATIENT)
Dept: ADMINISTRATIVE | Facility: HOSPITAL | Age: 65
End: 2020-01-29

## 2020-06-23 ENCOUNTER — PATIENT OUTREACH (OUTPATIENT)
Dept: ADMINISTRATIVE | Facility: HOSPITAL | Age: 65
End: 2020-06-23

## 2020-11-20 DIAGNOSIS — R92.8 ABNORMAL FINDING ON BREAST IMAGING: Primary | ICD-10-CM

## 2020-11-24 ENCOUNTER — TELEPHONE (OUTPATIENT)
Dept: ADMINISTRATIVE | Facility: HOSPITAL | Age: 65
End: 2020-11-24

## 2021-06-24 ENCOUNTER — PATIENT OUTREACH (OUTPATIENT)
Dept: ADMINISTRATIVE | Facility: HOSPITAL | Age: 66
End: 2021-06-24

## 2021-09-30 ENCOUNTER — PATIENT OUTREACH (OUTPATIENT)
Dept: ADMINISTRATIVE | Facility: HOSPITAL | Age: 66
End: 2021-09-30

## 2021-11-03 LAB
LEFT EYE DM RETINOPATHY: NEGATIVE
RIGHT EYE DM RETINOPATHY: NEGATIVE

## 2022-02-25 ENCOUNTER — PATIENT OUTREACH (OUTPATIENT)
Dept: ADMINISTRATIVE | Facility: HOSPITAL | Age: 67
End: 2022-02-25
Payer: MEDICARE

## 2022-03-18 ENCOUNTER — PATIENT OUTREACH (OUTPATIENT)
Dept: ADMINISTRATIVE | Facility: HOSPITAL | Age: 67
End: 2022-03-18
Payer: MEDICARE

## 2022-08-19 ENCOUNTER — PATIENT OUTREACH (OUTPATIENT)
Dept: ADMINISTRATIVE | Facility: HOSPITAL | Age: 67
End: 2022-08-19
Payer: MEDICARE

## 2022-08-19 NOTE — PROGRESS NOTES
Contacted pt to remind about fit kit. Pt states she is out if states but will complete it when she get back.

## 2022-08-24 ENCOUNTER — LAB VISIT (OUTPATIENT)
Dept: LAB | Facility: HOSPITAL | Age: 67
End: 2022-08-24
Payer: MEDICARE

## 2022-08-24 DIAGNOSIS — Z12.11 COLON CANCER SCREENING: ICD-10-CM

## 2022-08-24 PROCEDURE — 82274 ASSAY TEST FOR BLOOD FECAL: CPT | Performed by: NURSE PRACTITIONER

## 2022-08-30 LAB — HEMOCCULT STL QL IA: NEGATIVE

## 2023-04-03 LAB
CHOLEST SERPL-MSCNC: 80 MG/DL (ref 0–200)
CHOLEST/HDLC SERPL: 3 {RATIO}
HBA1C MFR BLD: 6.4 %
HDLC SERPL-MCNC: 27 MG/DL
LDL CHOLESTEROL DIRECT: 41 MG/DL
NONHDLC SERPL-MCNC: 53 MG/DL
TRIGL SERPL-MCNC: 115 MG/DL
VLDL CHOLESTEROL: 23 MG/DL

## 2023-10-03 LAB — HEMOCCULT STL QL IA: NEGATIVE

## 2023-11-03 ENCOUNTER — PATIENT OUTREACH (OUTPATIENT)
Dept: ADMINISTRATIVE | Facility: HOSPITAL | Age: 68
End: 2023-11-03
Payer: MEDICARE

## 2023-11-03 DIAGNOSIS — Z12.31 ENCOUNTER FOR SCREENING MAMMOGRAM FOR BREAST CANCER: Primary | ICD-10-CM

## 2023-11-20 PROBLEM — E11.65 UNCONTROLLED TYPE 2 DIABETES MELLITUS WITH HYPERGLYCEMIA: Status: ACTIVE | Noted: 2023-11-20

## 2023-11-20 PROBLEM — E11.9 DIABETES MELLITUS TYPE II, CONTROLLED: Status: ACTIVE | Noted: 2023-11-20

## 2023-11-20 PROBLEM — E11.65 UNCONTROLLED TYPE 2 DIABETES MELLITUS WITH HYPERGLYCEMIA: Status: RESOLVED | Noted: 2023-11-20 | Resolved: 2023-11-20

## 2023-12-01 ENCOUNTER — LAB VISIT (OUTPATIENT)
Dept: LAB | Facility: HOSPITAL | Age: 68
End: 2023-12-01
Attending: NURSE PRACTITIONER
Payer: MEDICARE

## 2023-12-01 DIAGNOSIS — E83.52 HYPERCALCEMIA: ICD-10-CM

## 2023-12-01 LAB
CALCIUM SERPL-MCNC: 10.4 MG/DL (ref 8.7–10.5)
PTH-INTACT SERPL-MCNC: 53.1 PG/ML (ref 9–77)

## 2023-12-01 PROCEDURE — 36415 COLL VENOUS BLD VENIPUNCTURE: CPT | Performed by: NURSE PRACTITIONER

## 2023-12-01 PROCEDURE — 83970 ASSAY OF PARATHORMONE: CPT | Performed by: NURSE PRACTITIONER

## 2023-12-01 PROCEDURE — 82310 ASSAY OF CALCIUM: CPT | Performed by: NURSE PRACTITIONER

## 2024-04-30 LAB
LEFT EYE DM RETINOPATHY: NEGATIVE
RIGHT EYE DM RETINOPATHY: NEGATIVE

## 2024-05-14 ENCOUNTER — PATIENT OUTREACH (OUTPATIENT)
Dept: ADMINISTRATIVE | Facility: HOSPITAL | Age: 69
End: 2024-05-14
Payer: MEDICARE

## 2024-05-20 PROBLEM — Z79.4 CONTROLLED TYPE 2 DIABETES MELLITUS, WITH LONG-TERM CURRENT USE OF INSULIN: Status: ACTIVE | Noted: 2023-11-20

## 2024-09-13 ENCOUNTER — HOSPITAL ENCOUNTER (EMERGENCY)
Facility: HOSPITAL | Age: 69
Discharge: HOME | End: 2024-09-13
Attending: SURGERY
Payer: MEDICARE

## 2024-09-13 VITALS
BODY MASS INDEX: 25.41 KG/M2 | TEMPERATURE: 98 F | DIASTOLIC BLOOD PRESSURE: 66 MMHG | OXYGEN SATURATION: 98 % | HEIGHT: 60 IN | SYSTOLIC BLOOD PRESSURE: 142 MMHG | RESPIRATION RATE: 16 BRPM | WEIGHT: 129.44 LBS | HEART RATE: 85 BPM

## 2024-09-13 DIAGNOSIS — K52.9 ENTERITIS: Primary | ICD-10-CM

## 2024-09-13 DIAGNOSIS — K21.00 GASTROESOPHAGEAL REFLUX DISEASE WITH ESOPHAGITIS WITHOUT HEMORRHAGE: Chronic | ICD-10-CM

## 2024-09-13 LAB
ALBUMIN SERPL BCP-MCNC: 4 G/DL (ref 3.5–5.2)
ALP SERPL-CCNC: 57 U/L (ref 55–135)
ALT SERPL W/O P-5'-P-CCNC: 15 U/L (ref 10–44)
ANION GAP SERPL CALC-SCNC: 8 MMOL/L (ref 8–16)
AST SERPL-CCNC: 12 U/L (ref 10–40)
BASOPHILS # BLD AUTO: 0.04 K/UL (ref 0–0.2)
BASOPHILS NFR BLD: 0.5 % (ref 0–1.9)
BILIRUB SERPL-MCNC: 0.4 MG/DL (ref 0.1–1)
BILIRUB UR QL STRIP: NEGATIVE
BUN SERPL-MCNC: 24 MG/DL (ref 8–23)
CALCIUM SERPL-MCNC: 9.7 MG/DL (ref 8.7–10.5)
CHLORIDE SERPL-SCNC: 114 MMOL/L (ref 95–110)
CLARITY UR: CLEAR
CO2 SERPL-SCNC: 16 MMOL/L (ref 23–29)
COLOR UR: YELLOW
CREAT SERPL-MCNC: 0.8 MG/DL (ref 0.5–1.4)
DIFFERENTIAL METHOD BLD: ABNORMAL
EOSINOPHIL # BLD AUTO: 0.2 K/UL (ref 0–0.5)
EOSINOPHIL NFR BLD: 2.5 % (ref 0–8)
ERYTHROCYTE [DISTWIDTH] IN BLOOD BY AUTOMATED COUNT: 17 % (ref 11.5–14.5)
EST. GFR  (NO RACE VARIABLE): >60 ML/MIN/1.73 M^2
GLUCOSE SERPL-MCNC: 92 MG/DL (ref 70–110)
GLUCOSE UR QL STRIP: NEGATIVE
HCT VFR BLD AUTO: 36.6 % (ref 37–48.5)
HGB BLD-MCNC: 11.5 G/DL (ref 12–16)
HGB UR QL STRIP: ABNORMAL
IMM GRANULOCYTES # BLD AUTO: 0.04 K/UL (ref 0–0.04)
IMM GRANULOCYTES NFR BLD AUTO: 0.5 % (ref 0–0.5)
KETONES UR QL STRIP: NEGATIVE
LEUKOCYTE ESTERASE UR QL STRIP: NEGATIVE
LIPASE SERPL-CCNC: 61 U/L (ref 4–60)
LYMPHOCYTES # BLD AUTO: 2 K/UL (ref 1–4.8)
LYMPHOCYTES NFR BLD: 23.4 % (ref 18–48)
MCH RBC QN AUTO: 25.5 PG (ref 27–31)
MCHC RBC AUTO-ENTMCNC: 31.4 G/DL (ref 32–36)
MCV RBC AUTO: 81 FL (ref 82–98)
MONOCYTES # BLD AUTO: 0.9 K/UL (ref 0.3–1)
MONOCYTES NFR BLD: 10.5 % (ref 4–15)
NEUTROPHILS # BLD AUTO: 5.3 K/UL (ref 1.8–7.7)
NEUTROPHILS NFR BLD: 62.6 % (ref 38–73)
NITRITE UR QL STRIP: NEGATIVE
NRBC BLD-RTO: 0 /100 WBC
PH UR STRIP: 5 [PH] (ref 5–8)
PLATELET # BLD AUTO: 159 K/UL (ref 150–450)
PMV BLD AUTO: 11 FL (ref 9.2–12.9)
POTASSIUM SERPL-SCNC: 6 MMOL/L (ref 3.5–5.1)
PROT SERPL-MCNC: 7.1 G/DL (ref 6–8.4)
PROT UR QL STRIP: NEGATIVE
RBC # BLD AUTO: 4.51 M/UL (ref 4–5.4)
SODIUM SERPL-SCNC: 138 MMOL/L (ref 136–145)
SP GR UR STRIP: 1.01 (ref 1–1.03)
TROPONIN I SERPL DL<=0.01 NG/ML-MCNC: <0.006 NG/ML (ref 0–0.03)
TROPONIN I SERPL DL<=0.01 NG/ML-MCNC: <0.006 NG/ML (ref 0–0.03)
URN SPEC COLLECT METH UR: ABNORMAL
UROBILINOGEN UR STRIP-ACNC: NEGATIVE EU/DL
WBC # BLD AUTO: 8.38 K/UL (ref 3.9–12.7)

## 2024-09-13 PROCEDURE — 99900031 HC PATIENT EDUCATION (STAT)

## 2024-09-13 PROCEDURE — 81003 URINALYSIS AUTO W/O SCOPE: CPT | Performed by: SURGERY

## 2024-09-13 PROCEDURE — 99285 EMERGENCY DEPT VISIT HI MDM: CPT | Mod: 25

## 2024-09-13 PROCEDURE — 99900035 HC TECH TIME PER 15 MIN (STAT)

## 2024-09-13 PROCEDURE — 83690 ASSAY OF LIPASE: CPT | Performed by: SURGERY

## 2024-09-13 PROCEDURE — 80053 COMPREHEN METABOLIC PANEL: CPT | Performed by: SURGERY

## 2024-09-13 PROCEDURE — 85025 COMPLETE CBC W/AUTO DIFF WBC: CPT | Performed by: SURGERY

## 2024-09-13 PROCEDURE — 93005 ELECTROCARDIOGRAM TRACING: CPT

## 2024-09-13 PROCEDURE — 84484 ASSAY OF TROPONIN QUANT: CPT | Performed by: SURGERY

## 2024-09-13 PROCEDURE — 93010 ELECTROCARDIOGRAM REPORT: CPT | Mod: ,,, | Performed by: INTERNAL MEDICINE

## 2024-09-13 PROCEDURE — 25000003 PHARM REV CODE 250: Performed by: SURGERY

## 2024-09-13 RX ORDER — LIDOCAINE HYDROCHLORIDE 20 MG/ML
15 SOLUTION OROPHARYNGEAL ONCE
Status: COMPLETED | OUTPATIENT
Start: 2024-09-13 | End: 2024-09-13

## 2024-09-13 RX ORDER — SUCRALFATE 1 G/10ML
1 SUSPENSION ORAL 4 TIMES DAILY
Qty: 1200 ML | Refills: 0 | Status: SHIPPED | OUTPATIENT
Start: 2024-09-13 | End: 2024-10-13

## 2024-09-13 RX ORDER — ALUMINUM HYDROXIDE, MAGNESIUM HYDROXIDE, AND SIMETHICONE 1200; 120; 1200 MG/30ML; MG/30ML; MG/30ML
30 SUSPENSION ORAL ONCE
Status: COMPLETED | OUTPATIENT
Start: 2024-09-13 | End: 2024-09-13

## 2024-09-13 RX ORDER — PANTOPRAZOLE SODIUM 40 MG/1
40 TABLET, DELAYED RELEASE ORAL DAILY
Qty: 30 TABLET | Refills: 0 | Status: SHIPPED | OUTPATIENT
Start: 2024-09-13 | End: 2024-10-13

## 2024-09-13 RX ADMIN — LIDOCAINE HYDROCHLORIDE 15 ML: 20 SOLUTION ORAL at 07:09

## 2024-09-13 RX ADMIN — ALUMINUM HYDROXIDE, MAGNESIUM HYDROXIDE, AND SIMETHICONE 30 ML: 200; 200; 20 SUSPENSION ORAL at 07:09

## 2024-09-13 NOTE — ED TRIAGE NOTES
68 y.o. female presents to ER Room/bed info not found   Chief Complaint   Patient presents with    Abdominal Pain   .   C/o abd pain for three weeks

## 2024-09-14 NOTE — ED TRIAGE NOTES
"Pt presents to ED with c/o intermittent upper abdominal pain x3 weeks. Pt denies abdominal pain at this time, but states "when it starts it feels like an eight of ten." Pt also reports nausea and weakness at time that she is experiencing the abdominal pain.   "

## 2024-09-14 NOTE — ED PROVIDER NOTES
Encounter Date: 2024       History     Chief Complaint   Patient presents with    Abdominal Pain     History of Present Illness  Cindy Hernandez is a 68 y.o. female that presents with indigestion for 3 weeks  Patient states that she gets nauseated with epigastric pain with any meals for weeks  Patient has no right upper quadrant tenderness with no history of gallbladder disease  Patient states that she has been eating spicy food with a long history of PUD noted   Denies chest pain, normal sinus rhythm on EKG with no ST elevation noted on exam  Nonacute abdomen, stable vital signs, patient thinks that her ulcers are acting up now    The history is provided by the patient.     Review of patient's allergies indicates:   Allergen Reactions    Sulfa (sulfonamide antibiotics) Hives     Past Medical History:   Diagnosis Date    Arthritis     Diabetes mellitus, type 2     GERD (gastroesophageal reflux disease)     Hyperlipidemia     Hypertension     Pacemaker      Past Surgical History:   Procedure Laterality Date    CATARACT EXTRACTION, BILATERAL       SECTION      INSERTION OF PACEMAKER Left 2022     Family History   Problem Relation Name Age of Onset    Diabetes Mellitus Mother      Heart attack Father      Stroke Sister      Diabetes Mellitus Brother       Social History     Tobacco Use    Smoking status: Former     Current packs/day: 0.00     Average packs/day: 0.5 packs/day for 25.0 years (12.5 ttl pk-yrs)     Types: Cigarettes     Start date: 4/3/1994     Quit date: 4/3/2019     Years since quittin.4    Smokeless tobacco: Former   Substance Use Topics    Alcohol use: No     Alcohol/week: 0.0 standard drinks of alcohol    Drug use: No     Review of Systems   Constitutional: Negative.    HENT: Negative.     Eyes: Negative.    Respiratory: Negative.     Cardiovascular: Negative.    Gastrointestinal:  Positive for abdominal pain and nausea.   Genitourinary: Negative.     Musculoskeletal: Negative.    Skin: Negative.    Neurological: Negative.    Psychiatric/Behavioral: Negative.         Physical Exam     Initial Vitals [09/13/24 1503]   BP Pulse Resp Temp SpO2   130/69 80 18 98 °F (36.7 °C) 100 %      MAP       --         Physical Exam    Nursing note and vitals reviewed.  Constitutional: Vital signs are normal. She appears well-developed and well-nourished. She is cooperative.   HENT:   Head: Normocephalic and atraumatic.   Eyes: Conjunctivae, EOM and lids are normal. Pupils are equal, round, and reactive to light.   Neck: Trachea normal and phonation normal. Neck supple. No JVD present.   Normal range of motion.   Full passive range of motion without pain.     Cardiovascular:  Normal rate, regular rhythm, S1 normal, S2 normal, normal heart sounds, intact distal pulses and normal pulses.           Pulmonary/Chest: Effort normal and breath sounds normal.   Abdominal: Abdomen is soft and flat. Bowel sounds are normal.   Musculoskeletal:         General: Normal range of motion.      Cervical back: Full passive range of motion without pain, normal range of motion and neck supple.     Neurological: She is alert and oriented to person, place, and time. She has normal strength.   Skin: Skin is warm, dry and intact. Capillary refill takes less than 2 seconds.         ED Course   Procedures  Labs Reviewed   COMPREHENSIVE METABOLIC PANEL - Abnormal       Result Value    Sodium 138      Potassium 6.0 (*)     Chloride 114 (*)     CO2 16 (*)     Glucose 92      BUN 24 (*)     Creatinine 0.8      Calcium 9.7      Total Protein 7.1      Albumin 4.0      Total Bilirubin 0.4      Alkaline Phosphatase 57      AST 12      ALT 15      eGFR >60      Anion Gap 8     CBC W/ AUTO DIFFERENTIAL - Abnormal    WBC 8.38      RBC 4.51      Hemoglobin 11.5 (*)     Hematocrit 36.6 (*)     MCV 81 (*)     MCH 25.5 (*)     MCHC 31.4 (*)     RDW 17.0 (*)     Platelets 159      MPV 11.0      Immature Granulocytes  0.5      Gran # (ANC) 5.3      Immature Grans (Abs) 0.04      Lymph # 2.0      Mono # 0.9      Eos # 0.2      Baso # 0.04      nRBC 0      Gran % 62.6      Lymph % 23.4      Mono % 10.5      Eosinophil % 2.5      Basophil % 0.5      Differential Method Automated     LIPASE - Abnormal    Lipase 61 (*)    URINALYSIS, REFLEX TO URINE CULTURE - Abnormal    Specimen UA Urine, Clean Catch      Color, UA Yellow      Appearance, UA Clear      pH, UA 5.0      Specific Gravity, UA 1.015      Protein, UA Negative      Glucose, UA Negative      Ketones, UA Negative      Bilirubin (UA) Negative      Occult Blood UA Trace (*)     Nitrite, UA Negative      Urobilinogen, UA Negative      Leukocytes, UA Negative      Narrative:     Specimen Source->Urine   TROPONIN I    Troponin I <0.006     TROPONIN I     EKG Readings: (Independently Interpreted)   EKG performed at 6:39 p.m. on September 13, 2024  Atrial paced rhythm without any ST elevation noted  Ventricular rate of 83 beats per minute  TN interval & QRS duration were normal today  No previous EKG available for comparison today  John Manzo M.D. 7:49 PM 9/13/2024        Imaging Results              X-Ray Abdomen Flat And Erect (Final result)  Result time 09/13/24 19:41:06      Final result by Yokasta Mann MD (09/13/24 19:41:06)                   Impression:      Ileus versus enteritis.      Electronically signed by: Yokasta Mann  Date:    09/13/2024  Time:    19:41               Narrative:    EXAMINATION:  XR ABDOMEN FLAT AND ERECT    CLINICAL HISTORY:  Nausea (787.02);    TECHNIQUE:  Flat and erect AP views of the abdomen were performed.    COMPARISON:  None    FINDINGS:  Prominent gaseous distension of small and large bowel suggestive of ileus or enteritis.  Lung bases are clear.                                       Medications   aluminum-magnesium hydroxide-simethicone 200-200-20 mg/5 mL suspension 30 mL (has no administration in time range)     And    LIDOcaine viscous HCl 2% oral solution 15 mL (has no administration in time range)     Medical Decision Making  Differential includes peptic ulcer disease, GERD, gastritis, enteritis, gallstones    Problems Addressed:  Enteritis: complicated acute illness or injury  Gastroesophageal reflux disease with esophagitis without hemorrhage: complicated acute illness or injury    Amount and/or Complexity of Data Reviewed  Labs: ordered. Decision-making details documented in ED Course.  Radiology: ordered and independent interpretation performed.  ECG/medicine tests: ordered and independent interpretation performed.    ED Management & Risks of Complication, Morbidity, & Mortality:  Normal sinus rhythm on EKG with no ST elevation noted now  Lab work within normal limits, (-) troponin in the emergency room   Patient feels 100% better after GI cocktail this evening in the ER  Will stop eating spicy foods, will prescribe Protonix & Carafate  I have also suggested that the patient follow-up with GI physician  Also follow-up with PCP & return with any concerns on discharge    Need for Hospitalization or Surgery with Social Determinants of Health:  This patient does not need to be hospitalized on ER evaluation today  The patient's diagnosis is not limited by social determinants of health  Does not require surgery or procedure (major or minor), no risk factors    Clinical Impression:  Final diagnoses:  [K52.9] Enteritis (Primary)  [K21.00] Gastroesophageal reflux disease with esophagitis without hemorrhage (Chronic)          ED Disposition Condition    Discharge Stable          ED Prescriptions       Medication Sig Dispense Start Date End Date Auth. Provider    pantoprazole (PROTONIX) 40 MG tablet Take 1 tablet (40 mg total) by mouth once daily. 30 tablet 9/13/2024 10/13/2024 John Manzo MD    sucralfate (CARAFATE) 100 mg/mL suspension Take 10 mLs (1 g total) by mouth 4 (four) times daily. 1,200 mL 9/13/2024 10/13/2024  John Manzo MD          Follow-up Information       Follow up With Specialties Details Why Contact Info    Lian Soria NP Internal Medicine, Family Medicine Schedule an appointment as soon as possible for a visit in 2 days  [address] [facility] [address]  [phone]               John Manzo MD  09/13/24 1951

## 2024-09-16 LAB
OHS QRS DURATION: 124 MS
OHS QTC CALCULATION: 474 MS

## 2024-11-12 ENCOUNTER — LAB VISIT (OUTPATIENT)
Dept: LAB | Facility: HOSPITAL | Age: 69
End: 2024-11-12
Attending: NURSE PRACTITIONER
Payer: MEDICARE

## 2024-11-12 DIAGNOSIS — E78.49 OTHER HYPERLIPIDEMIA: Chronic | ICD-10-CM

## 2024-11-12 DIAGNOSIS — I10 ESSENTIAL HYPERTENSION: Chronic | ICD-10-CM

## 2024-11-12 DIAGNOSIS — Z79.4 CONTROLLED TYPE 2 DIABETES MELLITUS WITH HYPERGLYCEMIA, WITH LONG-TERM CURRENT USE OF INSULIN: ICD-10-CM

## 2024-11-12 DIAGNOSIS — E11.65 CONTROLLED TYPE 2 DIABETES MELLITUS WITH HYPERGLYCEMIA, WITH LONG-TERM CURRENT USE OF INSULIN: ICD-10-CM

## 2024-11-12 DIAGNOSIS — Z79.4 CONTROLLED TYPE 2 DIABETES MELLITUS WITH COMPLICATION, WITH LONG-TERM CURRENT USE OF INSULIN: ICD-10-CM

## 2024-11-12 DIAGNOSIS — E11.8 CONTROLLED TYPE 2 DIABETES MELLITUS WITH COMPLICATION, WITH LONG-TERM CURRENT USE OF INSULIN: ICD-10-CM

## 2024-11-12 LAB
ALBUMIN SERPL BCP-MCNC: 3.8 G/DL (ref 3.5–5.2)
ALBUMIN/CREAT UR: 30.4 UG/MG (ref 0–30)
ALP SERPL-CCNC: 67 U/L (ref 40–150)
ALT SERPL W/O P-5'-P-CCNC: 12 U/L (ref 10–44)
ANION GAP SERPL CALC-SCNC: 8 MMOL/L (ref 8–16)
ANION GAP SERPL CALC-SCNC: 8 MMOL/L (ref 8–16)
AST SERPL-CCNC: 15 U/L (ref 10–40)
BILIRUB SERPL-MCNC: 0.3 MG/DL (ref 0.1–1)
BUN SERPL-MCNC: 15 MG/DL (ref 8–23)
BUN SERPL-MCNC: 15 MG/DL (ref 8–23)
CALCIUM SERPL-MCNC: 10 MG/DL (ref 8.7–10.5)
CALCIUM SERPL-MCNC: 10 MG/DL (ref 8.7–10.5)
CHLORIDE SERPL-SCNC: 114 MMOL/L (ref 95–110)
CHLORIDE SERPL-SCNC: 114 MMOL/L (ref 95–110)
CHOLEST SERPL-MCNC: 89 MG/DL (ref 120–199)
CHOLEST/HDLC SERPL: 3.3 {RATIO} (ref 2–5)
CO2 SERPL-SCNC: 22 MMOL/L (ref 23–29)
CO2 SERPL-SCNC: 22 MMOL/L (ref 23–29)
CREAT SERPL-MCNC: 0.8 MG/DL (ref 0.5–1.4)
CREAT SERPL-MCNC: 0.8 MG/DL (ref 0.5–1.4)
CREAT UR-MCNC: 46.1 MG/DL (ref 15–325)
EST. GFR  (NO RACE VARIABLE): >60 ML/MIN/1.73 M^2
EST. GFR  (NO RACE VARIABLE): >60 ML/MIN/1.73 M^2
ESTIMATED AVG GLUCOSE: 131 MG/DL (ref 68–131)
GLUCOSE SERPL-MCNC: 120 MG/DL (ref 70–110)
GLUCOSE SERPL-MCNC: 120 MG/DL (ref 70–110)
HBA1C MFR BLD: 6.2 % (ref 4–5.6)
HDLC SERPL-MCNC: 27 MG/DL (ref 40–75)
HDLC SERPL: 30.3 % (ref 20–50)
LDLC SERPL CALC-MCNC: 42.8 MG/DL (ref 63–159)
MICROALBUMIN UR DL<=1MG/L-MCNC: 14 UG/ML
NONHDLC SERPL-MCNC: 62 MG/DL
POTASSIUM SERPL-SCNC: 4.5 MMOL/L (ref 3.5–5.1)
POTASSIUM SERPL-SCNC: 4.5 MMOL/L (ref 3.5–5.1)
PROT SERPL-MCNC: 7.1 G/DL (ref 6–8.4)
SODIUM SERPL-SCNC: 144 MMOL/L (ref 136–145)
SODIUM SERPL-SCNC: 144 MMOL/L (ref 136–145)
TRIGL SERPL-MCNC: 96 MG/DL (ref 30–150)

## 2024-11-12 PROCEDURE — 80061 LIPID PANEL: CPT | Performed by: NURSE PRACTITIONER

## 2024-11-12 PROCEDURE — 82043 UR ALBUMIN QUANTITATIVE: CPT | Performed by: NURSE PRACTITIONER

## 2024-11-12 PROCEDURE — 80053 COMPREHEN METABOLIC PANEL: CPT | Performed by: NURSE PRACTITIONER

## 2024-11-12 PROCEDURE — 83036 HEMOGLOBIN GLYCOSYLATED A1C: CPT | Performed by: NURSE PRACTITIONER

## 2024-11-12 PROCEDURE — 36415 COLL VENOUS BLD VENIPUNCTURE: CPT | Performed by: NURSE PRACTITIONER

## 2024-11-20 PROBLEM — E11.8 CONTROLLED TYPE 2 DIABETES MELLITUS WITH COMPLICATION, WITH LONG-TERM CURRENT USE OF INSULIN: Status: ACTIVE | Noted: 2023-11-20

## 2024-11-20 PROBLEM — I42.9 CARDIOMYOPATHY, UNSPECIFIED: Status: ACTIVE | Noted: 2024-11-20

## 2025-06-04 ENCOUNTER — HOSPITAL ENCOUNTER (EMERGENCY)
Facility: HOSPITAL | Age: 70
Discharge: HOME | End: 2025-06-04
Payer: MEDICARE

## 2025-06-04 VITALS
WEIGHT: 130.31 LBS | DIASTOLIC BLOOD PRESSURE: 58 MMHG | HEART RATE: 84 BPM | RESPIRATION RATE: 18 BRPM | HEIGHT: 60 IN | BODY MASS INDEX: 25.58 KG/M2 | SYSTOLIC BLOOD PRESSURE: 120 MMHG | TEMPERATURE: 98 F | OXYGEN SATURATION: 100 %

## 2025-06-04 DIAGNOSIS — M25.539 WRIST PAIN: ICD-10-CM
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
## 2025-06-04 DIAGNOSIS — M79.606 LEG PAIN: ICD-10-CM

## 2025-06-04 PROCEDURE — 99284 EMERGENCY DEPT VISIT MOD MDM: CPT | Mod: 25

## 2025-06-04 RX ORDER — METHOCARBAMOL 500 MG/1
1000 TABLET, FILM COATED ORAL 3 TIMES DAILY
Qty: 30 TABLET | Refills: 0 | Status: SHIPPED | OUTPATIENT
Start: 2025-06-04 | End: 2025-06-09
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
## 2025-06-25 ENCOUNTER — LAB VISIT (OUTPATIENT)
Dept: LAB | Facility: HOSPITAL | Age: 70
End: 2025-06-25
Payer: MEDICARE

## 2025-06-25 DIAGNOSIS — Z12.11 COLON CANCER SCREENING: ICD-10-CM

## 2025-06-25 PROCEDURE — 82274 ASSAY TEST FOR BLOOD FECAL: CPT

## 2025-06-27 LAB — OB PNL STL IA: NEGATIVE
